# Patient Record
Sex: MALE | Race: WHITE | Employment: FULL TIME | URBAN - METROPOLITAN AREA
[De-identification: names, ages, dates, MRNs, and addresses within clinical notes are randomized per-mention and may not be internally consistent; named-entity substitution may affect disease eponyms.]

---

## 2024-11-13 ENCOUNTER — ANESTHESIA EVENT (OUTPATIENT)
Dept: SURGERY | Facility: HOSPITAL | Age: 29
End: 2024-11-13
Payer: COMMERCIAL

## 2024-11-14 ENCOUNTER — HOSPITAL ENCOUNTER (INPATIENT)
Facility: HOSPITAL | Age: 29
LOS: 4 days | Discharge: HOME | End: 2024-11-18
Attending: ORTHOPAEDIC SURGERY | Admitting: ORTHOPAEDIC SURGERY
Payer: COMMERCIAL

## 2024-11-14 ENCOUNTER — ANESTHESIA (OUTPATIENT)
Dept: SURGERY | Facility: HOSPITAL | Age: 29
End: 2024-11-14
Payer: COMMERCIAL

## 2024-11-14 DIAGNOSIS — L02.413 ABSCESS OF RIGHT SHOULDER: Primary | ICD-10-CM

## 2024-11-14 LAB
ANION GAP SERPL CALC-SCNC: 6 MMOL/L (ref 0–18)
BUN BLD-MCNC: 11 MG/DL (ref 9–23)
CALCIUM BLD-MCNC: 9.5 MG/DL (ref 8.7–10.4)
CHLORIDE SERPL-SCNC: 106 MMOL/L (ref 98–112)
CO2 SERPL-SCNC: 25 MMOL/L (ref 21–32)
CREAT BLD-MCNC: 0.92 MG/DL
EGFRCR SERPLBLD CKD-EPI 2021: 115 ML/MIN/1.73M2 (ref 60–?)
ERYTHROCYTE [SEDIMENTATION RATE] IN BLOOD: 17 MM/HR
GLUCOSE BLD-MCNC: 142 MG/DL (ref 70–99)
OSMOLALITY SERPL CALC.SUM OF ELEC: 286 MOSM/KG (ref 275–295)
POTASSIUM SERPL-SCNC: 4.2 MMOL/L (ref 3.5–5.1)
SODIUM SERPL-SCNC: 137 MMOL/L (ref 136–145)

## 2024-11-14 PROCEDURE — 87075 CULTR BACTERIA EXCEPT BLOOD: CPT | Performed by: ORTHOPAEDIC SURGERY

## 2024-11-14 PROCEDURE — 87205 SMEAR GRAM STAIN: CPT | Performed by: ORTHOPAEDIC SURGERY

## 2024-11-14 PROCEDURE — 0PB90ZZ EXCISION OF RIGHT CLAVICLE, OPEN APPROACH: ICD-10-PCS | Performed by: ORTHOPAEDIC SURGERY

## 2024-11-14 PROCEDURE — 87102 FUNGUS ISOLATION CULTURE: CPT | Performed by: ORTHOPAEDIC SURGERY

## 2024-11-14 PROCEDURE — 87206 SMEAR FLUORESCENT/ACID STAI: CPT | Performed by: ORTHOPAEDIC SURGERY

## 2024-11-14 PROCEDURE — 85652 RBC SED RATE AUTOMATED: CPT | Performed by: ORTHOPAEDIC SURGERY

## 2024-11-14 PROCEDURE — 87176 TISSUE HOMOGENIZATION CULTR: CPT | Performed by: ORTHOPAEDIC SURGERY

## 2024-11-14 PROCEDURE — 87070 CULTURE OTHR SPECIMN AEROBIC: CPT | Performed by: ORTHOPAEDIC SURGERY

## 2024-11-14 PROCEDURE — 0MPX0YZ REMOVAL OF OTHER DEVICE FROM UPPER BURSA AND LIGAMENT, OPEN APPROACH: ICD-10-PCS | Performed by: ORTHOPAEDIC SURGERY

## 2024-11-14 PROCEDURE — 3E0T3BZ INTRODUCTION OF ANESTHETIC AGENT INTO PERIPHERAL NERVES AND PLEXI, PERCUTANEOUS APPROACH: ICD-10-PCS | Performed by: ORTHOPAEDIC SURGERY

## 2024-11-14 PROCEDURE — 0MB10ZZ EXCISION OF RIGHT SHOULDER BURSA AND LIGAMENT, OPEN APPROACH: ICD-10-PCS | Performed by: ORTHOPAEDIC SURGERY

## 2024-11-14 PROCEDURE — 80048 BASIC METABOLIC PNL TOTAL CA: CPT | Performed by: ORTHOPAEDIC SURGERY

## 2024-11-14 RX ORDER — HYDROMORPHONE HYDROCHLORIDE 1 MG/ML
0.4 INJECTION, SOLUTION INTRAMUSCULAR; INTRAVENOUS; SUBCUTANEOUS EVERY 5 MIN PRN
Status: DISCONTINUED | OUTPATIENT
Start: 2024-11-14 | End: 2024-11-14 | Stop reason: HOSPADM

## 2024-11-14 RX ORDER — HYDROMORPHONE HYDROCHLORIDE 1 MG/ML
0.2 INJECTION, SOLUTION INTRAMUSCULAR; INTRAVENOUS; SUBCUTANEOUS EVERY 2 HOUR PRN
Status: DISCONTINUED | OUTPATIENT
Start: 2024-11-14 | End: 2024-11-18

## 2024-11-14 RX ORDER — ROCURONIUM BROMIDE 10 MG/ML
INJECTION, SOLUTION INTRAVENOUS AS NEEDED
Status: DISCONTINUED | OUTPATIENT
Start: 2024-11-14 | End: 2024-11-14 | Stop reason: SURG

## 2024-11-14 RX ORDER — LABETALOL HYDROCHLORIDE 5 MG/ML
5 INJECTION, SOLUTION INTRAVENOUS EVERY 5 MIN PRN
Status: DISCONTINUED | OUTPATIENT
Start: 2024-11-14 | End: 2024-11-14 | Stop reason: HOSPADM

## 2024-11-14 RX ORDER — CLONIDINE 100 UG/ML
INJECTION, SOLUTION EPIDURAL AS NEEDED
Status: DISCONTINUED | OUTPATIENT
Start: 2024-11-14 | End: 2024-11-14 | Stop reason: SURG

## 2024-11-14 RX ORDER — VANCOMYCIN HYDROCHLORIDE
15 EVERY 8 HOURS
Status: DISCONTINUED | OUTPATIENT
Start: 2024-11-14 | End: 2024-11-16

## 2024-11-14 RX ORDER — HYDROMORPHONE HYDROCHLORIDE 1 MG/ML
0.6 INJECTION, SOLUTION INTRAMUSCULAR; INTRAVENOUS; SUBCUTANEOUS EVERY 5 MIN PRN
Status: DISCONTINUED | OUTPATIENT
Start: 2024-11-14 | End: 2024-11-14 | Stop reason: HOSPADM

## 2024-11-14 RX ORDER — MIDAZOLAM HYDROCHLORIDE 1 MG/ML
1 INJECTION INTRAMUSCULAR; INTRAVENOUS EVERY 5 MIN PRN
Status: DISCONTINUED | OUTPATIENT
Start: 2024-11-14 | End: 2024-11-14 | Stop reason: HOSPADM

## 2024-11-14 RX ORDER — TRANEXAMIC ACID 10 MG/ML
INJECTION, SOLUTION INTRAVENOUS AS NEEDED
Status: DISCONTINUED | OUTPATIENT
Start: 2024-11-14 | End: 2024-11-14 | Stop reason: SURG

## 2024-11-14 RX ORDER — SODIUM PHOSPHATE, DIBASIC AND SODIUM PHOSPHATE, MONOBASIC 7; 19 G/230ML; G/230ML
1 ENEMA RECTAL ONCE AS NEEDED
Status: DISCONTINUED | OUTPATIENT
Start: 2024-11-14 | End: 2024-11-18

## 2024-11-14 RX ORDER — HYDROMORPHONE HYDROCHLORIDE 1 MG/ML
INJECTION, SOLUTION INTRAMUSCULAR; INTRAVENOUS; SUBCUTANEOUS
Status: COMPLETED
Start: 2024-11-14 | End: 2024-11-14

## 2024-11-14 RX ORDER — ONDANSETRON 2 MG/ML
4 INJECTION INTRAMUSCULAR; INTRAVENOUS EVERY 6 HOURS PRN
Status: DISCONTINUED | OUTPATIENT
Start: 2024-11-14 | End: 2024-11-14 | Stop reason: HOSPADM

## 2024-11-14 RX ORDER — MIDAZOLAM HYDROCHLORIDE 1 MG/ML
INJECTION INTRAMUSCULAR; INTRAVENOUS AS NEEDED
Status: DISCONTINUED | OUTPATIENT
Start: 2024-11-14 | End: 2024-11-14 | Stop reason: SURG

## 2024-11-14 RX ORDER — ALBUTEROL SULFATE 0.83 MG/ML
2.5 SOLUTION RESPIRATORY (INHALATION) AS NEEDED
Status: DISCONTINUED | OUTPATIENT
Start: 2024-11-14 | End: 2024-11-14 | Stop reason: HOSPADM

## 2024-11-14 RX ORDER — DEXAMETHASONE SODIUM PHOSPHATE 10 MG/ML
INJECTION, SOLUTION INTRAMUSCULAR; INTRAVENOUS AS NEEDED
Status: DISCONTINUED | OUTPATIENT
Start: 2024-11-14 | End: 2024-11-14 | Stop reason: SURG

## 2024-11-14 RX ORDER — PROCHLORPERAZINE EDISYLATE 5 MG/ML
5 INJECTION INTRAMUSCULAR; INTRAVENOUS EVERY 8 HOURS PRN
Status: DISCONTINUED | OUTPATIENT
Start: 2024-11-14 | End: 2024-11-18

## 2024-11-14 RX ORDER — PROCHLORPERAZINE EDISYLATE 5 MG/ML
INJECTION INTRAMUSCULAR; INTRAVENOUS
Status: COMPLETED
Start: 2024-11-14 | End: 2024-11-14

## 2024-11-14 RX ORDER — HYDROMORPHONE HYDROCHLORIDE 1 MG/ML
0.4 INJECTION, SOLUTION INTRAMUSCULAR; INTRAVENOUS; SUBCUTANEOUS EVERY 2 HOUR PRN
Status: DISCONTINUED | OUTPATIENT
Start: 2024-11-14 | End: 2024-11-18

## 2024-11-14 RX ORDER — HYDROCODONE BITARTRATE AND ACETAMINOPHEN 10; 325 MG/1; MG/1
1 TABLET ORAL ONCE AS NEEDED
Status: DISCONTINUED | OUTPATIENT
Start: 2024-11-14 | End: 2024-11-14 | Stop reason: HOSPADM

## 2024-11-14 RX ORDER — OXYCODONE HYDROCHLORIDE 5 MG/1
5 TABLET ORAL EVERY 4 HOURS PRN
Status: DISCONTINUED | OUTPATIENT
Start: 2024-11-14 | End: 2024-11-18

## 2024-11-14 RX ORDER — TRANEXAMIC ACID 10 MG/ML
1000 INJECTION, SOLUTION INTRAVENOUS ONCE
Status: DISCONTINUED | OUTPATIENT
Start: 2024-11-14 | End: 2024-11-14 | Stop reason: HOSPADM

## 2024-11-14 RX ORDER — ONDANSETRON 2 MG/ML
INJECTION INTRAMUSCULAR; INTRAVENOUS AS NEEDED
Status: DISCONTINUED | OUTPATIENT
Start: 2024-11-14 | End: 2024-11-14 | Stop reason: SURG

## 2024-11-14 RX ORDER — LIDOCAINE HYDROCHLORIDE 40 MG/ML
SOLUTION TOPICAL AS NEEDED
Status: DISCONTINUED | OUTPATIENT
Start: 2024-11-14 | End: 2024-11-14 | Stop reason: SURG

## 2024-11-14 RX ORDER — ACETAMINOPHEN 500 MG
1000 TABLET ORAL ONCE AS NEEDED
Status: DISCONTINUED | OUTPATIENT
Start: 2024-11-14 | End: 2024-11-14 | Stop reason: HOSPADM

## 2024-11-14 RX ORDER — KETAMINE HYDROCHLORIDE 50 MG/ML
INJECTION, SOLUTION INTRAMUSCULAR; INTRAVENOUS AS NEEDED
Status: DISCONTINUED | OUTPATIENT
Start: 2024-11-14 | End: 2024-11-14 | Stop reason: SURG

## 2024-11-14 RX ORDER — SENNOSIDES 8.6 MG
17.2 TABLET ORAL NIGHTLY
Status: DISCONTINUED | OUTPATIENT
Start: 2024-11-14 | End: 2024-11-18

## 2024-11-14 RX ORDER — DIPHENHYDRAMINE HYDROCHLORIDE 50 MG/ML
12.5 INJECTION INTRAMUSCULAR; INTRAVENOUS AS NEEDED
Status: DISCONTINUED | OUTPATIENT
Start: 2024-11-14 | End: 2024-11-14 | Stop reason: HOSPADM

## 2024-11-14 RX ORDER — BUPIVACAINE HYDROCHLORIDE 2.5 MG/ML
INJECTION, SOLUTION EPIDURAL; INFILTRATION; INTRACAUDAL AS NEEDED
Status: DISCONTINUED | OUTPATIENT
Start: 2024-11-14 | End: 2024-11-14 | Stop reason: SURG

## 2024-11-14 RX ORDER — BUPIVACAINE HYDROCHLORIDE AND EPINEPHRINE 2.5; 5 MG/ML; UG/ML
INJECTION, SOLUTION EPIDURAL; INFILTRATION; INTRACAUDAL; PERINEURAL AS NEEDED
Status: DISCONTINUED | OUTPATIENT
Start: 2024-11-14 | End: 2024-11-14 | Stop reason: HOSPADM

## 2024-11-14 RX ORDER — ONDANSETRON 2 MG/ML
4 INJECTION INTRAMUSCULAR; INTRAVENOUS EVERY 6 HOURS PRN
Status: DISCONTINUED | OUTPATIENT
Start: 2024-11-14 | End: 2024-11-18

## 2024-11-14 RX ORDER — ACETAMINOPHEN 500 MG
1000 TABLET ORAL ONCE
Status: DISCONTINUED | OUTPATIENT
Start: 2024-11-14 | End: 2024-11-14 | Stop reason: HOSPADM

## 2024-11-14 RX ORDER — NALOXONE HYDROCHLORIDE 0.4 MG/ML
80 INJECTION, SOLUTION INTRAMUSCULAR; INTRAVENOUS; SUBCUTANEOUS AS NEEDED
Status: DISCONTINUED | OUTPATIENT
Start: 2024-11-14 | End: 2024-11-14 | Stop reason: HOSPADM

## 2024-11-14 RX ORDER — PROCHLORPERAZINE EDISYLATE 5 MG/ML
5 INJECTION INTRAMUSCULAR; INTRAVENOUS EVERY 8 HOURS PRN
Status: DISCONTINUED | OUTPATIENT
Start: 2024-11-14 | End: 2024-11-14 | Stop reason: HOSPADM

## 2024-11-14 RX ORDER — SODIUM CHLORIDE, SODIUM LACTATE, POTASSIUM CHLORIDE, CALCIUM CHLORIDE 600; 310; 30; 20 MG/100ML; MG/100ML; MG/100ML; MG/100ML
INJECTION, SOLUTION INTRAVENOUS CONTINUOUS
Status: DISCONTINUED | OUTPATIENT
Start: 2024-11-14 | End: 2024-11-14

## 2024-11-14 RX ORDER — HYDROMORPHONE HYDROCHLORIDE 1 MG/ML
0.2 INJECTION, SOLUTION INTRAMUSCULAR; INTRAVENOUS; SUBCUTANEOUS EVERY 5 MIN PRN
Status: DISCONTINUED | OUTPATIENT
Start: 2024-11-14 | End: 2024-11-14 | Stop reason: HOSPADM

## 2024-11-14 RX ORDER — HYDROCODONE BITARTRATE AND ACETAMINOPHEN 10; 325 MG/1; MG/1
2 TABLET ORAL ONCE AS NEEDED
Status: DISCONTINUED | OUTPATIENT
Start: 2024-11-14 | End: 2024-11-14 | Stop reason: HOSPADM

## 2024-11-14 RX ORDER — POLYETHYLENE GLYCOL 3350 17 G/17G
17 POWDER, FOR SOLUTION ORAL DAILY PRN
Status: DISCONTINUED | OUTPATIENT
Start: 2024-11-14 | End: 2024-11-18

## 2024-11-14 RX ORDER — DOCUSATE SODIUM 100 MG/1
100 CAPSULE, LIQUID FILLED ORAL 2 TIMES DAILY
Status: DISCONTINUED | OUTPATIENT
Start: 2024-11-14 | End: 2024-11-18

## 2024-11-14 RX ORDER — SODIUM CHLORIDE, SODIUM LACTATE, POTASSIUM CHLORIDE, CALCIUM CHLORIDE 600; 310; 30; 20 MG/100ML; MG/100ML; MG/100ML; MG/100ML
INJECTION, SOLUTION INTRAVENOUS CONTINUOUS
Status: DISCONTINUED | OUTPATIENT
Start: 2024-11-14 | End: 2024-11-14 | Stop reason: HOSPADM

## 2024-11-14 RX ORDER — MEPERIDINE HYDROCHLORIDE 25 MG/ML
12.5 INJECTION INTRAMUSCULAR; INTRAVENOUS; SUBCUTANEOUS AS NEEDED
Status: DISCONTINUED | OUTPATIENT
Start: 2024-11-14 | End: 2024-11-14 | Stop reason: HOSPADM

## 2024-11-14 RX ORDER — BISACODYL 10 MG
10 SUPPOSITORY, RECTAL RECTAL
Status: DISCONTINUED | OUTPATIENT
Start: 2024-11-14 | End: 2024-11-18

## 2024-11-14 RX ORDER — DOXEPIN HYDROCHLORIDE 50 MG/1
1 CAPSULE ORAL DAILY
Status: DISCONTINUED | OUTPATIENT
Start: 2024-11-15 | End: 2024-11-18

## 2024-11-14 RX ADMIN — DEXAMETHASONE SODIUM PHOSPHATE 8 MG: 10 INJECTION, SOLUTION INTRAMUSCULAR; INTRAVENOUS at 17:41:00

## 2024-11-14 RX ADMIN — MIDAZOLAM HYDROCHLORIDE 2 MG: 1 INJECTION INTRAMUSCULAR; INTRAVENOUS at 17:28:00

## 2024-11-14 RX ADMIN — ROCURONIUM BROMIDE 50 MG: 10 INJECTION, SOLUTION INTRAVENOUS at 17:35:00

## 2024-11-14 RX ADMIN — MIDAZOLAM HYDROCHLORIDE 2 MG: 1 INJECTION INTRAMUSCULAR; INTRAVENOUS at 17:31:00

## 2024-11-14 RX ADMIN — KETAMINE HYDROCHLORIDE 15 MG: 50 INJECTION, SOLUTION INTRAMUSCULAR; INTRAVENOUS at 17:31:00

## 2024-11-14 RX ADMIN — ROCURONIUM BROMIDE 20 MG: 10 INJECTION, SOLUTION INTRAVENOUS at 19:38:00

## 2024-11-14 RX ADMIN — TRANEXAMIC ACID 1000 MG: 10 INJECTION, SOLUTION INTRAVENOUS at 17:38:00

## 2024-11-14 RX ADMIN — LIDOCAINE HYDROCHLORIDE 4 ML: 40 SOLUTION TOPICAL at 17:36:00

## 2024-11-14 RX ADMIN — SODIUM CHLORIDE, SODIUM LACTATE, POTASSIUM CHLORIDE, CALCIUM CHLORIDE: 600; 310; 30; 20 INJECTION, SOLUTION INTRAVENOUS at 17:26:00

## 2024-11-14 RX ADMIN — ONDANSETRON 4 MG: 2 INJECTION INTRAMUSCULAR; INTRAVENOUS at 19:42:00

## 2024-11-14 RX ADMIN — CLONIDINE 30 MCG: 100 INJECTION, SOLUTION EPIDURAL at 17:33:00

## 2024-11-14 RX ADMIN — BUPIVACAINE HYDROCHLORIDE 15 ML: 2.5 INJECTION, SOLUTION EPIDURAL; INFILTRATION; INTRACAUDAL at 17:33:00

## 2024-11-14 RX ADMIN — DEXAMETHASONE SODIUM PHOSPHATE 2 MG: 10 INJECTION, SOLUTION INTRAMUSCULAR; INTRAVENOUS at 17:33:00

## 2024-11-14 RX ADMIN — SODIUM CHLORIDE, SODIUM LACTATE, POTASSIUM CHLORIDE, CALCIUM CHLORIDE: 600; 310; 30; 20 INJECTION, SOLUTION INTRAVENOUS at 20:40:00

## 2024-11-14 NOTE — H&P
ORTHOPEDIC SURGERY H&P      Patient Name:Cara Nguyễn  Date of Appointment: 11/14/2024     Chief Complaint: Patient presents with:  Right Shoulder - Follow - Up        HPI:    The patient is a 29 year old male 3 months status post right  shoulder incision and drainage of an abscess at a surgical site  on 7/1/2024. Patient is doing well. Has no pain.  He was scheduled for right shoulder I&D in early September but canceled this as he has not had significant improvement in symptom burden.  He does notice since that time he has had at times recurrence of prominent area present to the mid- aspect of the clavicle medial to the shoulder incision.  Denies any fevers, chills, sweats.  States that overall he has been returning to increased level of activity and is able to tolerate this well.  He completed laboratory work and comes in today to review results.  Patient states he had mild URI symptoms at the time of blood work.     Physical Exam:      There were no vitals taken for this visit.     Constitutional: NAD, well-developed, well-nourished.  Neuro: SILT and motor grossly intact in the median, ulnar, radial nerve distribution. AIN/PIN are intact.   MSK:   On examination of the right upper extremity, surgical incisions are well healed. There is no surrounding erythema, purulent drainage, palpable fluctuance. Area of firmness present just medial to prior incision without fluctuance. Compartments are soft and compressible. AROM demonstrates no deficits. No tenderness appreciated. Extremity is NVI with intact distal pulses and brisk capillary refill in all digits.      Diagnostic Studies:      CULTURE, ANAEROBIC BACTERIA W/GRAM STAIN         Micro Number:      [id number]     Test Status:       Final     Specimen Source:   Right shoulder superficial     Specimen Quality:  Adequate     Gram Stain:        Rare White blood cells seen       Result:            No anaerobes isolated.      CULTURE,FUNGUS,SKIN,HAIR, NAIL W/DIRECT  FLUOR/KOH         Micro Number:      [id number]     Test Status:       Preliminary     Specimen Source:   Not given     Specimen Quality:  Adequate     Smear:             No fungal elements seen.       Result:            No fungal growth at 1 week      4 views of the right shoulder including AP, Grashey, scapular Y, and axillary lateral demonstrate no acute fracture or dislocation.  There is well-preserved glenohumeral joint space without evidence of degenerative changes or joint space narrowing. There is some mild irregularity present to the clavicle without evidence of recurrent instability or fracture.     MRI of the right shoulder with and without contrast dated 8/19/2024 demonstrates area of of postsurgical changes following CC ligament reconstruction with a surrounding fluid collection with associated edema and increased enhancement concerning for possible infectious process.  Remaining aspects of the shoulder appropriate in appearance and consistent with prior biceps tenodesis.     Laboratory work from 10/1/2024 demonstrates normal white blood cell count of 10.5, normal sed rate of 4, elevated CRP of 1.5.     Assessment:      29 year old year old male presents with the following diagnoses:     1. Mass of joint of right shoulder  - AEROBIC BACTERIAL CULTURE; Future  - ANAEROBIC CULTURE; Future  - ANAEROBIC CULTURE  - AEROBIC BACTERIAL CULTURE        Plan:      Plan:   - To OR for right shoulder I&D with removal of reconstruction allograft and deep hardware, possible glenohumeral arthrotomy     Tommie Rasheed MD  [facility]  Orthopedic Surgery, Sports Medicine

## 2024-11-14 NOTE — ANESTHESIA PROCEDURE NOTES
Airway  Date/Time: 11/14/2024 5:36 PM  Urgency: elective      General Information and Staff    Patient location during procedure: OR  Anesthesiologist: Fantasma Valentine MD  Performed: anesthesiologist   Performed by: Fantasma Valentine MD  Authorized by: Fantasma Valentine MD      Indications and Patient Condition  Indications for airway management: anesthesia  Sedation level: deep  Preoxygenated: yes  Patient position: sniffing  Mask difficulty assessment: 1 - vent by mask    Final Airway Details  Final airway type: endotracheal airway      Successful airway: ETT  Cuffed: yes   Successful intubation technique: direct laryngoscopy  Endotracheal tube insertion site: oral  Blade: Jada  Blade size: #3  ETT size (mm): 7.5    Cormack-Lehane Classification: grade I - full view of glottis  Placement verified by: capnometry   Measured from: lips  ETT to lips (cm): 21  Number of attempts at approach: 1

## 2024-11-15 LAB
CREAT BLD-MCNC: 0.96 MG/DL
EGFRCR SERPLBLD CKD-EPI 2021: 110 ML/MIN/1.73M2 (ref 60–?)

## 2024-11-15 PROCEDURE — 82565 ASSAY OF CREATININE: CPT | Performed by: ORTHOPAEDIC SURGERY

## 2024-11-15 RX ORDER — HYDROCODONE BITARTRATE AND ACETAMINOPHEN 5; 325 MG/1; MG/1
1 TABLET ORAL EVERY 6 HOURS PRN
Qty: 16 TABLET | Refills: 0 | Status: SHIPPED | OUTPATIENT
Start: 2024-11-15

## 2024-11-15 RX ORDER — NAPROXEN 500 MG/1
500 TABLET ORAL 2 TIMES DAILY WITH MEALS
Qty: 42 TABLET | Refills: 1 | Status: SHIPPED | OUTPATIENT
Start: 2024-11-15

## 2024-11-15 RX ORDER — ONDANSETRON 4 MG/1
4 TABLET, FILM COATED ORAL EVERY 8 HOURS PRN
Qty: 16 TABLET | Refills: 0 | Status: SHIPPED | OUTPATIENT
Start: 2024-11-15

## 2024-11-15 NOTE — PROGRESS NOTES
Anesthesia Pain Service    POD# 1 s/p TSA    Block type: Upper extremity: Interscalene    Laterality: right    Injection technique: Single shot    Post op review: No evidence of immediate block related complications    Plan discussed with/by: Anesthesia - Jerod Rizo MD

## 2024-11-15 NOTE — PROGRESS NOTES
Pt attempting to void at this time.  C/o numbness to his right hand, right hand warm, +2 pulse, good cap refill.

## 2024-11-15 NOTE — ANESTHESIA PROCEDURE NOTES
Regional Block    Performed by: Fantasma Valentine MD  Authorized by: Fantasma Valentine MD      General Information and Staff    Start Time:   Anesthesiologist:  Fantasma Valentine MD  Performed by:  Anesthesiologist  Patient Location:  OR    Block Placement: Pre Induction  Site Identification: real time ultrasound guided, nerve stimulator and image stored and retrievable    Block site/laterality marked before start: site marked  Reason for Block: at surgeon's request and post-op pain management    Preanesthetic Checklist: 2 patient identifers, IV checked, site marked, risks and benefits discussed, monitors and equipment checked, pre-op evaluation, timeout performed, anesthesia consent, sterile technique used, no prohibitive neurological deficits and no local skin infection at insertion site      Procedure Details    Patient Position:  Supine  Prep: ChloraPrep    Monitoring:  Heart rate, cardiac monitor, continuous pulse ox and blood pressure cuff  Block Type:  Interscalene  Laterality:  Right  Injection Technique:  Single-shot    Needle    Needle Type:  Echogenic  Needle Gauge:  21 G  Needle Length:  100 mm  Needle Localization:  Nerve stimulator and ultrasound guidance  Reason for Ultrasound Use: appropriate spread of the medication was noted in real time and no ultrasound evidence of intravascular and/or intraneural injection    Nerve Stimulator: 0.8 amps    Muscle Twitch Response Comment: no response    Assessment    Injection Assessment:  Good spread noted, incremental injection, local visualized surrounding nerve on ultrasound, low pressure, negative aspiration for heme, no pain on injection and negative resistance  Paresthesia Pain:  None  Heart Rate Change: No    - Patient tolerated block procedure well without evidence of immediate block related complications.     Medications      Additional Comments    Bupivacaine 0.25% 15cc, decadron 2 mg PF, clonidine 30 mcg

## 2024-11-15 NOTE — CONSULTS
Infectious Disease Initial Consultation      Date of admission: 11/14/2024  3:44 PM     Date of service: 11/15/24 8:01 AM    Consult requested by: Tommie Rasheed MD    Reason for consult: Right shoulder infection    Chief complaint: Right shoulder pain    History of present illness: Cara Nguyễn is a 29 year old male with history of right shoulder repair in summer 2023, recently had an abscess at the surgical site back on 7/20/2024 for which he underwent an I&D.  Despite that, continued to have worsening pain.  Prior to the July, the patient had a cyst that was also removed.  He was also trialed on a prolonged course of oral antibiotics throughout the year and despite that continued to have recurrent cysts.  Eventually, had 1 aspirated that was cloudy and due to concerns of infection, was brought in for the debridement on 11/14.    Upon presentation here, the patient was afebrile, hemodynamically stable.  BMP did not show any acute abnormalities.  He was taken to the OR on 9/14, surgical cultures are pending.  The patient is currently on IV ceftriaxone and IV vancomycin.  Cultures from his abscess I&D back in July did not grow any organisms.    Risk factors/Exposures:  Living situation: At home with family  Sick contacts: None  Animals: No pets or other animal exposure  Travel: No recent local/international travel  /longterm/halfway: None  Outdoor activities: Nothing unusual  Sexual behavior: Heterosexual, no high risk sexual behavior  Active TB exposure: None  Employment: Currently unemployed  IV drug use: None    Review of systems:  All other components of the review of systems are negative, except those described in the history of present illness.     Past Medical History:    Calculus of kidney     Past Surgical History:   Procedure Laterality Date    Arthroscopy of joint unlisted Right     x 3    Tonsillectomy       Social History     Socioeconomic History    Marital status: Single   Tobacco Use     Smoking status: Never    Smokeless tobacco: Never   Vaping Use    Vaping status: Never Used   Substance and Sexual Activity    Alcohol use: Yes     Alcohol/week: 3.0 standard drinks of alcohol     Types: 3 Cans of beer per week    Drug use: Never     Social Drivers of Health     Food Insecurity: No Food Insecurity (11/14/2024)    Food Insecurity     Food Insecurity: Never true   Transportation Needs: No Transportation Needs (11/14/2024)    Transportation Needs     Lack of Transportation: No   Housing Stability: Low Risk  (11/14/2024)    Housing Stability     Housing Instability: No     History reviewed. No pertinent family history.  Reviewed, see above    Medications:    sennosides    docusate sodium    polyethylene glycol (PEG 3350)    magnesium hydroxide    bisacodyl    fleet enema    ondansetron    prochlorperazine    multivitamin    oxyCODONE **OR** oxyCODONE    HYDROmorphone **OR** HYDROmorphone    cefTRIAXone    vancomycin     Allergies:  Allergies[1]    Physical Exam:  Vitals:    11/15/24 0553   BP: 119/61   Pulse: 62   Resp: 16   Temp: 98.2 °F (36.8 °C)     Vitals signs and nursing note reviewed.   Constitutional:       Appearance: Normal appearance.   HENT:      Head: Normocephalic and atraumatic.      Mouth: Mucous membranes are moist.   Neck:      Musculoskeletal: Neck supple.   Cardiovascular:      Rate and Rhythm: Normal rate.      Heart sounds: Normal heart sounds. No murmur. No friction rub. No gallop.    Pulmonary:      Effort: Pulmonary effort is normal. No respiratory distress.      Breath sounds: Normal breath sounds. No stridor. No wheezing, rhonchi or rales.   Chest:      Chest wall: No tenderness.   Abdominal:      General: Abdomen is flat. There is no distension.      Palpations: Abdomen is soft. There is no mass.      Tenderness: There is no tenderness. There is no guarding or rebound.      Hernia: No hernia is present.   Musculoskeletal:      Right lower leg: No edema.      Left lower leg:  No edema.   Skin:     General: Skin is warm and dry.   Neurological:      General: No focal deficit present.      Mental Status: Alert and oriented to person, place, and time.     Laboratory data:  I have independently reviewed all lab results; including old microbiological results.  Lab Results   Component Value Date    CREATSERUM 0.96 11/15/2024    BUN 11 11/14/2024     11/14/2024    K 4.2 11/14/2024     11/14/2024    CO2 25.0 11/14/2024     11/14/2024    CA 9.5 11/14/2024    ESRML 17 11/14/2024        No results for input(s): \"RBC\", \"HGB\", \"HCT\", \"MCV\", \"MCH\", \"MCHC\", \"RDW\", \"NEPRELIM\", \"WBC\", \"PLT\", \"NEUT\", \"LYMPH\", \"MON\", \"EOS\", \"NRBC\" in the last 168 hours.    Microbiology data:  No results found for this visit on 11/14/24.    Impression:  Cara Nguyễn is a 29 year old male with     Right shoulder infection  This is in the setting of a shoulder repair back in the summer 2023 with a graft and sutures  Now the graft was removed except for a very small part that was left very close to his neurovascular bundle  Status post I&D debridement and explant of prosthesis on 11/14/2024  Cultures pending  Currently on ceftriaxone and vancomycin    Recommendations:     Continue to follow-up on surgical cultures  Will need to hold the cultures for 3 weeks  Continue ceftriaxone and IV vancomycin  I explained to the patient that given small part of the graft was retained, there is always a possibility of the infection could recur.  I would recommend a prolonged course of IV antibiotics followed by oral suppression for several months prior to cessation.  However, I explained to him that up to 20% of patients could have recurrent infection in the setting of retained hardware.  He understood.  PICC line ordered  Continue to monitor daily labs for antibiotic toxicity  Further recommendations will depend on the above work-up and clinical progress     The plan of care was discussed with the primary  hospital team, Tommie Rasheed MD     Recommendations were also discussed with the patient; all questions were answered.     Thank you for this consultation. Please don't hesitate to call the ID team for questions or any acute changes in patient's clinical condition.    Please note that this report has been produced using speech recognition software and may contain errors related to that system including, but not limited to, errors in grammar, punctuation, and spelling, as well as words and phrases that possibly may have been recognized inappropriately.  If there are any questions or concerns, contact the dictating provider for clarification.    The  [employer] Cures Act makes medical notes like these available to patients in the interest of transparency. Please be advised this is a medical document. Medical documents are intended to carry relevant information, facts as evident, and the clinical opinion of the practitioner. The medical note is intended as peer to peer communication and may appear blunt or direct. It is written in medical language and may contain abbreviations or verbiage that are unfamiliar.     Nam Lawrence MD  [facility]. Tel: [phone]. Fax: [phone].     Cara Nguyễn : 8/10/1995 MRN: [id number] CSN: [id number]          [1] No Known Allergies

## 2024-11-15 NOTE — PROGRESS NOTES
[facility] Dosing Service      Initial Pharmacokinetic Consult for Vancomycin Dosing     Cara Nguyễn is a 29 year old male who is being initiated on vancomycin therapy for osteomyelitis/I&D of right shoulder abscess/removal of hardware. Pharmacy has been asked to dose vancomycin by Dr. Rasheed.  The initial treatment and monitoring approach will be steady state AUC strategy.        Weight and Temperature:    Wt Readings from Last 1 Encounters:   24 87.1 kg (192 lb)        Temp Readings from Last 1 Encounters:   24 97.7 °F (36.5 °C) (Oral)      Labs:   Recent Labs   Lab 24  2238   CREATSERUM 0.92      Estimated Creatinine Clearance: 118.5 mL/min (based on SCr of 0.92 mg/dL).     No results for input(s): \"WBC\" in the last 168 hours.       The Pharmacokinetic Target is:       to 600 mg-h/L and trough <=15 mg/L    Renal Dosing Considerations:      None     Assessment/Plan:       Maintenance dose: Pharmacy will dose vancomycin at 1250 mg IV every 8 hours.    Monitorin) Plan for vancomycin peak and trough to be obtained at steady state.    2) Pharmacy will order SCr as clinically indicated to assess renal function.    3) Pharmacy will monitor for toxicity and efficacy, adjust vancomycin dose and/or frequency, and order vancomycin levels as appropriate per the Pharmacy and Therapeutics Committee approved protocol until discontinuation of the medication.       We appreciate the opportunity to assist in the care of this patient.     Quin Tejeda, Atif  2024  11:11 PM  [facility] Extension: [phone]

## 2024-11-15 NOTE — PAYOR COMM NOTE
ADMISSION REVIEW     Payor: [employer] [place] POS/MCNP  Subscriber #:  [id number]  Authorization Number: [id number]    Admit date: 11/14/24  Admit time:  9:30 PM       REVIEW DOCUMENTATION:  ED Provider Notes    No notes of this type exist for this encounter.         MEDICATIONS ADMINISTERED IN LAST 1 DAY:  bupivacaine PF (Marcaine) 0.25% injection       Date Action Dose Route User    11/14/2024 1733 Given 15 mL Injection Fantasma Valentine MD          bupivacaine 0.25%-EPINEPHrine 1:200,000 PF (Marcaine/Epinephrine PF) injection       Date Action Dose Route User    11/14/2024 2003 Given 30 mL Infiltration (Right Shoulder) Tommie Rasheed MD          ceFAZolin (Ancef) 2g in 10mL IV syringe premix       Date Action Dose Route User    11/14/2024 1809 Given 2 g Intravenous Fantasma Valentine MD          cefTRIAXone (Rocephin) 2 g in sodium chloride 0.9% 100 mL IVPB-ADDV       Date Action Dose Route User    11/14/2024 2301 New Bag 2 g Intravenous Shelley Wilson RN          cloNIDine (analgesia) (Duraclon) 100 MCG/ML epidural injection       Date Action Dose Route User    11/14/2024 1733 Given 30 mcg Other Fantasma Valentine MD          dexAMETHasone PF (Decadron) 10 MG/ML injection       Date Action Dose Route User    11/14/2024 1733 Given 2 mg Injection Fantasma Valentine MD          dexAMETHasone PF (Decadron) 10 MG/ML injection       Date Action Dose Route User    11/14/2024 1741 Given 8 mg Intravenous Fantasma Valentine MD          docusate sodium (Colace) cap 100 mg       Date Action Dose Route User    11/15/2024 0900 Given 100 mg Oral Osmin Martinez RN    11/14/2024 2301 Given 100 mg Oral Shelley Wilson RN          fentaNYL (Sublimaze) 50 mcg/mL injection       Date Action Dose Route User    11/14/2024 1812 Given 100 mcg Intravenous Fantasma Valentine MD    11/14/2024 1751 Given 100 mcg Intravenous Fantasma Valentine MD          HYDROmorphone (Dilaudid) 1 MG/ML injection 0.4 mg       Date Action Dose Route User    11/14/2024 2051 Given 0.4 mg Intravenous Savaglio,  AMOL Felipe          HYDROmorphone (Dilaudid) 1 MG/ML injection 0.6 mg       Date Action Dose Route User    11/14/2024 2100 Given 0.6 mg Intravenous Matteo Barrientos RN          ketamine (Ketalar) 50 MG/ML injection       Date Action Dose Route User    11/14/2024 1731 Given 15 mg Intravenous Fantasma Valentine MD          lactated ringers infusion       Date Action Dose Route User    11/14/2024 1726 Restarted (none) Intravenous Fantasma Valentine MD    11/14/2024 1619 New Bag (none) Intravenous Marisabel Blackwell RN          Lidocaine HCl (LARYNG-O-SET) 4 % solution       Date Action Dose Route User    11/14/2024 1736 Given 4 mL Endotracheal Fantasma Valentine MD          midazolam (Versed) 2 MG/2ML injection       Date Action Dose Route User    11/14/2024 1731 Given 2 mg Intravenous Fantasma Valentine MD    11/14/2024 1728 Given 2 mg Intravenous Fantasma Valentine MD          ondansetron (Zofran) 4 MG/2ML injection       Date Action Dose Route User    11/14/2024 1942 Given 4 mg Intravenous Sukhi Penny MD          oxyCODONE immediate release tab 5 mg       Date Action Dose Route User    11/15/2024 1314 Given 5 mg Oral Osmin Martinez RN          prochlorperazine (Compazine) 10 MG/2ML injection 5 mg       Date Action Dose Route User    11/14/2024 2051 Given 5 mg Intravenous Matteo Barrientos RN          propofol (Diprivan) 10 MG/ML injection       Date Action Dose Route User    11/14/2024 1735 Given 200 mg Intravenous Fantasma Valentine MD          rocuronium (Zemuron) 50 mg/5mL injection       Date Action Dose Route User    11/14/2024 1938 Given 20 mg Intravenous Sukhi Penny MD    11/14/2024 1735 Given 50 mg Intravenous Fantasma Valentine MD          sennosides (Senokot) tab 17.2 mg       Date Action Dose Route User    11/14/2024 2301 Given 17.2 mg Oral Shelley Wilson RN          sugammadex (Bridion) 200 MG/2ML injection       Date Action Dose Route User    11/14/2024 2013 Given 200 mg Intravenous Sukhi Penny MD          multivitamin (Tab-A-Shakira/Beta  Carotene) tab 1 tablet       Date Action Dose Route User    11/15/2024 0901 Given 1 tablet Oral Osmin Martinez RN          tranexamic acid in sodium chloride 0.7% (Cyklokapron) 1000 mg/100mL infusion premix       Date Action Dose Route User    11/14/2024 1738 Given 1,000 mg Intravenous Fantasma Valentine MD          vancomycin (Vancocin) 1.25 g in sodium chloride 0.9% 250mL IVPB premix       Date Action Dose Route User    11/15/2024 0840 New Bag 1,250 mg Intravenous Osmin Martinez RN    11/15/2024 0000 New Bag 1,250 mg Intravenous Shelley Wilson RN            Vitals (last day)       Date/Time Temp Pulse Resp BP SpO2 Weight O2 Device O2 Flow Rate (L/min) [facility]    11/15/24 1350 98.6 °F (37 °C) 74 20 118/63 98 % -- None (Room air) --     11/15/24 0806 97.6 °F (36.4 °C) 60 18 126/59 98 % -- None (Room air) --     11/15/24 0553 98.2 °F (36.8 °C) 62 16 119/61 95 % -- None (Room air) 0 L/min     11/15/24 0000 98.1 °F (36.7 °C) 56 16 113/59 95 % -- Nasal cannula 3 L/min     11/14/24 2156 -- -- -- -- -- 192 lb (87.1 kg) -- --     11/14/24 2156 97.7 °F (36.5 °C) 66 16 108/71 95 % -- Nasal cannula 3 L/min CM    11/14/24 2115 -- 66 16 112/62 95 % -- -- -- LSA    11/14/24 2100 98.2 °F (36.8 °C) 87 17 -- 96 % -- Nasal cannula 3 L/min A    11/14/24 2055 -- 97 16 114/89 97 % -- -- -- LSA    11/14/24 2050 -- 79 18 -- -- -- -- -- LSA    11/14/24 2045 -- 85 12 122/79 96 % -- -- -- LSA    11/14/24 2040 98.1 °F (36.7 °C) 80 16 110/48 94 % -- -- -- SA    11/14/24 2035 -- 80 19 124/82 -- -- -- -- LSA    11/14/24 2030 97.1 °F (36.2 °C) 85 14 110/48 95 % -- Nasal cannula 3 L/min LSA    11/14/24 1609 98.8 °F (37.1 °C) 65 65 134/92 100 % 192 lb (87.1 kg) None (Room air) -- NH         11/14/2024 H&P    ORTHOPEDIC SURGERY H&P      Patient Name:Cara Nguyễn  Date of Appointment: 11/14/2024     Chief Complaint: Patient presents with:  Right Shoulder - Follow - Up        HPI:    The patient is a 29 year old male 3 months status post  right  shoulder incision and drainage of an abscess at a surgical site  on 7/1/2024. Patient is doing well. Has no pain.  He was scheduled for right shoulder I&D in early September but canceled this as he has not had significant improvement in symptom burden.  He does notice since that time he has had at times recurrence of prominent area present to the mid- aspect of the clavicle medial to the shoulder incision.  Denies any fevers, chills, sweats.  States that overall he has been returning to increased level of activity and is able to tolerate this well.  He completed laboratory work and comes in today to review results.  Patient states he had mild URI symptoms at the time of blood work.     Physical Exam:      There were no vitals taken for this visit.     Constitutional: NAD, well-developed, well-nourished.  Neuro: SILT and motor grossly intact in the median, ulnar, radial nerve distribution. AIN/PIN are intact.   MSK:   On examination of the right upper extremity, surgical incisions are well healed. There is no surrounding erythema, purulent drainage, palpable fluctuance. Area of firmness present just medial to prior incision without fluctuance. Compartments are soft and compressible. AROM demonstrates no deficits. No tenderness appreciated. Extremity is NVI with intact distal pulses and brisk capillary refill in all digits.      Diagnostic Studies:      CULTURE, ANAEROBIC BACTERIA W/GRAM STAIN         Micro Number:      [id number]     Test Status:       Final     Specimen Source:   Right shoulder superficial     Specimen Quality:  Adequate     Gram Stain:        Rare White blood cells seen       Result:            No anaerobes isolated.      CULTURE,FUNGUS,SKIN,HAIR, NAIL W/DIRECT FLUOR/KOH         Micro Number:      [id number]     Test Status:       Preliminary     Specimen Source:   Not given     Specimen Quality:  Adequate     Smear:             No fungal elements seen.       Result:            No fungal growth  at 1 week      4 views of the right shoulder including AP, Grashey, scapular Y, and axillary lateral demonstrate no acute fracture or dislocation.  There is well-preserved glenohumeral joint space without evidence of degenerative changes or joint space narrowing. There is some mild irregularity present to the clavicle without evidence of recurrent instability or fracture.     MRI of the right shoulder with and without contrast dated 8/19/2024 demonstrates area of of postsurgical changes following CC ligament reconstruction with a surrounding fluid collection with associated edema and increased enhancement concerning for possible infectious process.  Remaining aspects of the shoulder appropriate in appearance and consistent with prior biceps tenodesis.     Laboratory work from 10/1/2024 demonstrates normal white blood cell count of 10.5, normal sed rate of 4, elevated CRP of 1.5.     Assessment:      29 year old year old male presents with the following diagnoses:     1. Mass of joint of right shoulder  - AEROBIC BACTERIAL CULTURE; Future  - ANAEROBIC CULTURE; Future  - ANAEROBIC CULTURE  - AEROBIC BACTERIAL CULTURE        Plan:      Plan:   - To OR for right shoulder I&D with removal of reconstruction allograft and deep hardware, possible glenohumeral arthrotomy     Tommie Rasheed MD  [facility]  Orthopedic Surgery, Sports Medicine    Tommie Rasheed MD   Physician  Orthopedics     Operative Report     Signed     Date of Service: 11/14/2024  5:57 PM  Case Time: Procedures: Surgeons:   11/14/2024  5:57 PM RIGHT SHOULDER IRRIGATION AND DEBRIDEMENT, REMOVAL OF IMPLANT DEEP, Revision of surgical scar    Tommie Rasheed MD               Signed         Pre-Operative Diagnosis: RIGHT SHOULDER ABSCESS; INFECTION OF DEEP IMPLANT     Post-Operative Diagnosis: RIGHT SHOULDER ABSCESS; INFECTION OF DEEP IMPLANT      Procedure Performed:   RIGHT SHOULDER SURGICAL SCAR REVISION, IRRIGATION AND DEBRIDEMENT, REMOVAL OF  IMPLANT DEEP     Surgeons and Role:     * Tommie Rasheed MD - Primary     Assistant(s):  PA: Ryder Kwok PA-C     Skilled assistance was needed for patient positioning, prepping and draping, instrument holding and passing, retracting, and suturing.     Surgical Findings: Abscess involving superior aspect of prior ligament reconstruction with bone involvement of adjacent clavicle without extension deep to the joint or to the AC joint     Specimen: Deep culture x3     Estimated Blood Loss: Blood Output: 100 mL (2024  8:02 PM)                  [facility] Orthopedics  Progress Note           Cara Nguyễn Patient Status:  Outpatient in a Bed    8/10/1995 MRN [id number]   [facility] 3SW-A Attending Tommie Rasheed MD   Hosp Day # 0 PCP JOSE RAUL DIAZ, LIA       Subjective:  POD #1 from a right shoulder irrigation and debridement and removal of deep implant, revision of surgical scar on 2024 with Dr. Rasheed.     No overnight events.  Patient's nerve block is still in place and he is starting to feel some sensation in his fingertips but does not have any sensation proximally.  He states that his pain is very well-controlled.  Patient is in a sling and is tolerating this well.  Denies nausea or vomiting.  Denies fever or chills.  Denies chest pain shortness of breath.           Objective:  Vital signs in last 24 hours:  Patient Vitals for the past 24 hrs:    BP Temp Temp src Pulse Resp SpO2 Weight   11/15/24 0806 126/59 97.6 °F (36.4 °C) Oral 60 18 98 % --   11/15/24 0553 119/61 98.2 °F (36.8 °C) Oral 62 16 95 % --   11/15/24 0000 113/59 98.1 °F (36.7 °C) Oral 56 16 95 % --   24 2156 108/71 97.7 °F (36.5 °C) Oral 66 16 95 % 192 lb (87.1 kg)   24 112/62 -- -- 66 16 95 % --   24 2100 -- 98.2 °F (36.8 °C) Temporal 87 17 96 % --   24 114/89 -- -- 97 16 97 % --   24 -- -- -- 79 18 -- --   24 122/79 -- -- 85 12 96 % --    11/14/24 2040 110/48 98.1 °F (36.7 °C) -- 80 16 94 % --   11/14/24 2035 124/82 -- -- 80 19 -- --   11/14/24 2030 110/48 97.1 °F (36.2 °C) Temporal 85 14 95 % --   11/14/24 1609 (!) 134/92 98.8 °F (37.1 °C) Oral 65 (!) 65 100 % 192 lb (87.1 kg)            General: A&Ox3, NAD  Neuro: Nerve block still in place.  Motor grossly intact in the median, ulnar, radial nerve distribution of the hand.  Sensation intact in the ulnar distribution of the hand but sensation to light touch is not intact proximally due to nerve block.  MSK:   Right upper extremity currently in sling.  Surgical dressings in place and RN good condition.  No strikethrough drainage appreciated.  There is no streaking erythema of the arm distally.  Has good range of motion of the wrist, digits, elbow.  Extremity is neurovascular intact.      Results  Data Review  CBC: No results found for: \"WBC\", \"RBC\", \"HGB\", \"HCT\", \"PLT\"        Assessment  Microbiology data:  No results found for this visit on 11/14/24.      Assessment/Plan:     This is a 29-year-old male who is status post CC ligament reconstruction as well as open biceps tenodesis to the right shoulder on 9/5/2023.  He subsequently developed a postoperative abscess that was incised and drained on 7/1/2024 but had recurring infection despite oral antibiotic regimen.  Patient is currently postop day 1 from a repeat right shoulder irrigation and debridement, removal of hardware and deep implant, revision of surgical scar.  Patient was admitted for IV antibiotics as well as consultation with infectious disease.  Patient overall is doing well and is stable.  Pain well-controlled with nerve block in place.     1) continue to maintain surgical dressing for the next 4 to 5 days and keep the dressings clean and dry.  4 to 5 days postop he removed the surgical dressings and continue to apply waterproof Band-Aids over the surgical incisions.  2) continue to use sling as tolerated.  Once nerve block is worn  off and he is able to tolerate movement of the shoulder he is cleared to discontinue the sling as tolerated.  3) patient may continue to progress his shoulder range of motion as tolerated.  He will be nonweightbearing about the right shoulder.  4) currently on ceftriaxone and vancomycin for broad-spectrum coverage of known infection.  Infectious disease on consult for antibiotic management.  5) will continue to follow for cultures.  Please hold cultures for at least 21 days to monitor for P acnes.  6) DVT ppx: Mechanical only.  7) continue to follow for culture result.  Patient is cleared from orthopedic standpoint to be discharged once infectious disease establishes antibiotic plan.  Patient will follow-up as outpatient in 2 weeks.  Patient's postoperative pain medication was sent to the patient's pharmacy.  Infectious disease will manage outpatient antibiotics.           Ryder Kwok PA-C  [facility]  Orthopedic Surgery  11/15/2024  7:23 AM

## 2024-11-15 NOTE — ANESTHESIA POSTPROCEDURE EVALUATION
[facility]    Cara Nguyễn Patient Status:  Hospital Outpatient Surgery   Age/Gender 29 year old male MRN [id number]   Location [facility] POST ANESTHESIA CARE UNIT Attending Tommie Rasheed MD   Hosp Day # 0 PCP JOSE RAUL DIAZ, LIA       Anesthesia Post-op Note    RIGHT SHOULDER IRRIGATION AND DEBRIDEMENT, REMOVAL OF IMPLANT DEEP, Revision of surgical scar    Procedure Summary       Date: 11/14/24 Room / Location:  MAIN OR 12 /  MAIN OR    Anesthesia Start: 1726 Anesthesia Stop:     Procedure: RIGHT SHOULDER IRRIGATION AND DEBRIDEMENT, REMOVAL OF IMPLANT DEEP, Revision of surgical scar (Right: Shoulder) Diagnosis: (ABSCESS; MASS OF JOINT)    Surgeons: Tommie Rasheed MD Anesthesiologist: Sukhi Penny MD    Anesthesia Type: general, regional ASA Status: 2            Anesthesia Type: general, regional    Vitals Value Taken Time   /48 11/14/24 2040   Temp 98.1 °F (36.7 °C) 11/14/24 2040   Pulse 78 11/14/24 2040   Resp 15 11/14/24 2040   SpO2 96 % 11/14/24 2040   Vitals shown include unfiled device data.    Patient Location: PACU    Anesthesia Type: general    Airway Patency: patent    Postop Pain Control: adequate    Mental Status: mildly sedated but able to meaningfully participate in the post-anesthesia evaluation    Nausea/Vomiting: none    Cardiopulmonary/Hydration status: stable euvolemic    Complications: no apparent anesthesia related complications    Postop vital signs: stable    Dental Exam: Unchanged from Preop    Patient to be discharged from PACU when criteria met.

## 2024-11-15 NOTE — PROGRESS NOTES
[facility] Orthopedics  Progress Note    Cara Nguyễn Patient Status:  Outpatient in a Bed    8/10/1995 MRN [id number]   [facility] 3SW-A Attending Tommie Rasheed MD   Hosp Day # 0 PCP JOSE RAUL DIAZ, LIA       Subjective:  POD #1 from a right shoulder irrigation and debridement and removal of deep implant, revision of surgical scar on 2024 with Dr. Rasheed.    No overnight events.  Patient's nerve block is still in place and he is starting to feel some sensation in his fingertips but does not have any sensation proximally.  He states that his pain is very well-controlled.  Patient is in a sling and is tolerating this well.  Denies nausea or vomiting.  Denies fever or chills.  Denies chest pain shortness of breath.          Objective:  Vital signs in last 24 hours:  Patient Vitals for the past 24 hrs:   BP Temp Temp src Pulse Resp SpO2 Weight   11/15/24 0806 126/59 97.6 °F (36.4 °C) Oral 60 18 98 % --   11/15/24 0553 119/61 98.2 °F (36.8 °C) Oral 62 16 95 % --   11/15/24 0000 113/59 98.1 °F (36.7 °C) Oral 56 16 95 % --   24 2156 108/71 97.7 °F (36.5 °C) Oral 66 16 95 % 192 lb (87.1 kg)   245 112/62 -- -- 66 16 95 % --   24 -- 98.2 °F (36.8 °C) Temporal 87 17 96 % --   24 114/89 -- -- 97 16 97 % --   24 -- -- -- 79 18 -- --   24 122/79 -- -- 85 12 96 % --   24 110/48 98.1 °F (36.7 °C) -- 80 16 94 % --   24 124/82 -- -- 80 19 -- --   24 110/48 97.1 °F (36.2 °C) Temporal 85 14 95 % --   24 1609 (!) 134/92 98.8 °F (37.1 °C) Oral 65 (!) 65 100 % 192 lb (87.1 kg)         General: A&Ox3, NAD  Neuro: Nerve block still in place.  Motor grossly intact in the median, ulnar, radial nerve distribution of the hand.  Sensation intact in the ulnar distribution of the hand but sensation to light touch is not intact proximally due to nerve block.  MSK:   Right upper extremity currently in sling.   Surgical dressings in place and RN good condition.  No strikethrough drainage appreciated.  There is no streaking erythema of the arm distally.  Has good range of motion of the wrist, digits, elbow.  Extremity is neurovascular intact.          Data Review  CBC: No results found for: \"WBC\", \"RBC\", \"HGB\", \"HCT\", \"PLT\"          Microbiology data:  No results found for this visit on 11/14/24.     Assessment/Plan:    This is a 29-year-old male who is status post CC ligament reconstruction as well as open biceps tenodesis to the right shoulder on 9/5/2023.  He subsequently developed a postoperative abscess that was incised and drained on 7/1/2024 but had recurring infection despite oral antibiotic regimen.  Patient is currently postop day 1 from a repeat right shoulder irrigation and debridement, removal of hardware and deep implant, revision of surgical scar.  Patient was admitted for IV antibiotics as well as consultation with infectious disease.  Patient overall is doing well and is stable.  Pain well-controlled with nerve block in place.    1) continue to maintain surgical dressing for the next 4 to 5 days and keep the dressings clean and dry.  4 to 5 days postop he removed the surgical dressings and continue to apply waterproof Band-Aids over the surgical incisions.  2) continue to use sling as tolerated.  Once nerve block is worn off and he is able to tolerate movement of the shoulder he is cleared to discontinue the sling as tolerated.  3) patient may continue to progress his shoulder range of motion as tolerated.  He will be nonweightbearing about the right shoulder.  4) currently on ceftriaxone and vancomycin for broad-spectrum coverage of known infection.  Infectious disease on consult for antibiotic management.  5) will continue to follow for cultures.  Please hold cultures for at least 21 days to monitor for P acnes.  6) DVT ppx: Mechanical only.  7) continue to follow for culture result.  Patient is cleared  from orthopedic standpoint to be discharged once infectious disease establishes antibiotic plan.  Patient will follow-up as outpatient in 2 weeks.  Patient's postoperative pain medication was sent to the patient's pharmacy.  Infectious disease will manage outpatient antibiotics.        Ryder Kwok PA-C  [facility]  Orthopedic Surgery  11/15/2024  7:23 AM

## 2024-11-15 NOTE — ANESTHESIA PREPROCEDURE EVALUATION
PRE-OP EVALUATION    Patient Name: Cara Nguyễn    Admit Diagnosis: ABSCESS; MASS OF JOINT    Pre-op Diagnosis: ABSCESS; MASS OF JOINT    RIGHT SHOULDER IRRIGATION AND DEBRIDEMENT, REMOVAL OF IMPLANT DEEP, POSSIBLE ARTHROTOMY WITH DRAINAGE    Anesthesia Procedure: RIGHT SHOULDER IRRIGATION AND DEBRIDEMENT, REMOVAL OF IMPLANT DEEP, POSSIBLE ARTHROTOMY WITH DRAINAGE (Right: Shoulder)    Surgeons and Role:     * Tommie Rasheed MD - Primary    Pre-op vitals reviewed.  Temp: 98.8 °F (37.1 °C)  Pulse: 65  Resp: 65  BP: 134/92  SpO2: 100 %  Body mass index is 28.35 kg/m².    Current medications reviewed.  Hospital Medications:   lactated ringers infusion   Intravenous Continuous    ceFAZolin (Ancef) 2 g/10mL IV syringe premix           Outpatient Medications:   Prescriptions Prior to Admission[1]    Allergies: Patient has no known allergies.      Anesthesia Evaluation    Patient summary reviewed.    Anesthetic Complications  (-) history of anesthetic complications         GI/Hepatic/Renal    Negative GI/hepatic/renal ROS.                             Cardiovascular        Exercise tolerance: good     MET: >4      (+) hypertension                       (-) angina              Endo/Other                                  Pulmonary    Negative pulmonary ROS.           (-) shortness of breath            Neuro/Psych    Negative neuro/psych ROS.                                  Past Surgical History:   Procedure Laterality Date    Arthroscopy of joint unlisted Right     x 3    Tonsillectomy       Social History     Socioeconomic History    Marital status: Single   Tobacco Use    Smoking status: Never    Smokeless tobacco: Never   Vaping Use    Vaping status: Never Used   Substance and Sexual Activity    Alcohol use: Yes     Alcohol/week: 3.0 standard drinks of alcohol     Types: 3 Cans of beer per week    Drug use: Never     History   Drug Use Unknown     Available pre-op labs reviewed.                Airway      Mallampati: II  Mouth opening: 3 FB  TM distance: 4 - 6 cm  Neck ROM: full Cardiovascular      Rhythm: regular  Rate: normal     Dental             Pulmonary      Breath sounds clear to auscultation bilaterally.               Other findings              ASA: 2   Plan: general and regional  NPO status verified and patient meets guidelines.    Post-procedure pain management plan discussed with surgeon and patient.    Comment: I explained benefits of brachial plexus blocks to Cara Nguyễn including significant (but unlikely complete) pain relief following the surgical procedure, decreased need for postoperative opioid use. Realistic expectation for block duration was discussed as well. I also explained possible downsides of peripheral nerve blocks including failed block, phrenic block, facial droop, prolonged nerve blockade leading to prolonged numbness in upper extremity and hand, with possible muscle weakness necessitating arm sling. Other very rare complications such as local anesthetic systemic toxicity (LAST), infection, hematoma formation, and permanent nerve damage was also discussed. All questions were answered and patient demonstrated understanding of realistic expectations and risks of peripheral nerve blocks, and wishes to proceed with the procedure. Sites of block were marked by me with patient confirmation.       Plan/risks discussed with: patient and significant other                Present on Admission:  **None**             [1]   No medications prior to admission.

## 2024-11-15 NOTE — OPERATIVE REPORT
Pre-Operative Diagnosis: RIGHT SHOULDER ABSCESS; INFECTION OF DEEP IMPLANT     Post-Operative Diagnosis: RIGHT SHOULDER ABSCESS; INFECTION OF DEEP IMPLANT      Procedure Performed:   RIGHT SHOULDER SURGICAL SCAR REVISION, IRRIGATION AND DEBRIDEMENT, REMOVAL OF IMPLANT DEEP     Surgeons and Role:     * Tommie Rasheed MD - Primary     Assistant(s):  PA: Ryder Kwok PA-C     Skilled assistance was needed for patient positioning, prepping and draping, instrument holding and passing, retracting, and suturing.     Surgical Findings: Abscess involving superior aspect of prior ligament reconstruction with bone involvement of adjacent clavicle without extension deep to the joint or to the AC joint     Specimen: Deep culture x3     Estimated Blood Loss: Blood Output: 100 mL (11/14/2024  8:02 PM)    Indication for Procedure:  Cara Nguyễn is a 29-year-old male with a prior history of right CC ligament injury that occurred approximately 1 year ago who previously underwent CC ligament reconstruction with semitendinosus allograft and concomitant open biceps tenodesis.  Patient was doing well in his initial postoperative course until he developed a recurrent area of fluctuant prominence to the medial aspect of the clavicle for which she underwent I&D with ganglion cyst excision on Shikha 3 after which he had issues with wound drainage for which she underwent a subsequent I&D on July 1.  He was on 6 weeks of dual oral antibiotics thereafter with improvement in overall appearance but did have reoccurrence of medial clavicular swelling.  We had had a prolonged discussion regarding ongoing treatment options given the concern for recurrent infection in the shoulder and had previously planned for open I&D in early September, however, patient had been feeling he had been progressing activity well and symptoms were very minimal and wanted to continue observational care.  He had recurrence of swelling and worsening pain last  month and aspiration of the recurrent ganglion performed in office was again concerning for possible infectious etiology.  We discussed that at this time, the best plan moving forward would be for open removal of reconstruction allograft tissue with all deep implants and full antibiotic course.  Risk, benefits, and alternatives to surgery were discussed in detail with the patient and informed consent was obtained.    Operative Summary:  The patient was met in the preoperative holding area where the correct side and correct site were confirmed and the surgical site was marked.  Patient was transferred to the operating room and placed supine on a standard operating room table.  An interscalene block was performed by the anesthesia team followed by administration of general anesthesia.  Patient was placed into a modified beachchair position and the right upper extremity was prepped and draped in usual sterile fashion.  A WHO timeout was performed to confirm correct site, correct side, correct patient, correct procedure, and that preoperative antibiotics were being held pending deep cultures.    Patient's prior anterior shoulder incision was utilized in its entirety.  Given the mild hypertrophy present to the visualized skin incision, borders of the incision scar were delineated and the incision was excised through its entire length.  Dissection was then taken down through interval scar tissue to the anterior aspect of the deltoid.  Full-thickness skin flaps were raised both medially and laterally within the surgical field.  With progression wound medially, the area of palpable fluctuance was encountered and consolidation of purulent discharge was visualized.  2 deep cultures were taken and sent to the laboratory for aerobic and anaerobic culture.  This seemed to be overlying the prior area of CC ligament reconstruction.  Antibiotic were given once cultures were obtained.  Dissection was carried with the use of Bovie  electrocautery through prior scar tissue implants distally to the anterior aspect of the pectoralis fascia.  The excision was then extended distally for an additional approximately 4 cm.  Standard deltopectoral interval for access to both the superior and inferior aspects of the coracoid given graft placement.  The deltopectoral interval was then developed with the use of dissection out of the cephalic vein for guidance given the modified scar tissue planes.  The subdeltoid space was developed and adhesions were broken up.  Dissection was carried just lateral to the conjoined tendon and the subconjoined recess was entered.  Attention was then turned to removal of the deep implant and CC ligament reconstruction allograft.  Given the location of the implant and the significant scar tissue that was present in its adjacent area, the anterior aspect of the deltoid was reflected off of the anterior clavicle adjacent to the reconstruction zone.  The semitendinosus allograft that had been used for the reconstruction was then dissected out of identifiable scar tissue planes though significant abundant scar tissue was present that limited the discrete delineation of exact planes.  The previously placed reconstruction suture tapes were both removed in their entirety and there was noted purulence around these tapes as such both were placed into a specimen cup and sent to the lab for culture.  Dissection was then carried deep into the subdeltoid space between the clavicle and the coracoid and the semitendinosus allograft tissue was further delineated and removed.  There were identified changes noted to the clavicle adjacent to the reconstruction tissue concerning for infectious extension to the bone.  Small area of hypertrophic mineralization of the cortex was debrided with the use of rongeur and rasp.  Curette was used to roughen up the surface and clean any area of deep bony tissue.  Wound was then irrigated with 12 L of  normal saline solution both in superior and inferior intervals deep to the deltoid superior to the coracoid as well as a normal subcoracoid deltopectoral recess.  There was marked improvement in the overall appearance of the surgical field tissue bed.  Hemostasis was maintained with electrocautery.  Attention was then turned to closure.  Surgical wound was closed in layers with use of monofilament suture given the presence of infection.  0 PDS was used to close both the deltopectoral interval as well as reattach the deltoid musculature off of the anterior clavicle.  Skin was then closed with 3-0 Monocryl and 3-0 nylon.  Sterile dressing was applied with Xeroform, 4 x 4 gauze, ABDs, and foam tape.  Patient was awakened from general anesthesia with no immediate complications appreciated.  Patient will be admitted to the inpatient floor given the complex nature of the infection encountered at the time of surgery for IV antibiotics and infectious disease consultation to help guide antibiotic course moving forward.    Tommie Rasheed MD  11/14/2024

## 2024-11-15 NOTE — PLAN OF CARE
Patient is alert and oriented x4. VSS on RA. Pain controlled at rest. Still with numbness to R upper extremity. Pulses intact. Moderate hand , Motor strength intact but limited d/t block.  Foam tape dressing. IS and ankle pumps encouraged. Belongings within reach. Encouraged to call for any needs.

## 2024-11-15 NOTE — DISCHARGE INSTRUCTIONS
You are scheduled to receive IV antibiotic teaching, weekly PICC line care and lab draws at the [facility] ambulatory infusion suite:  [address] [place] [address], [address]  [place], [place] [address] [phone]  Your first appointment will be on Tuesday 11/19/24.  You will receive a call with appointment details.      Tommie Rasheed MD  [facility]  Orthopaedic Surgery - Sports Medicine        Post Operative Instructions:      SLING / MOVEMENT  You were provided with a sling following surgery to provide support to the arm following the nerve block that was placed to help with post-operative pain.  Once you have regained control of the arm and the nerve block has completely worn off, you can remove the sling and move the shoulder, elbow, wrist and hand as you can tolerate. Avoid any lifting more than 2-3 lbs with the right shoulder.      Physical therapy can begin tomorrow to work to maintain the improvements in range of motion that were obtained with surgery.     ICE  You should use ice packs over the surgical site regularly throughout the day to help decrease swelling and pain. Make sure to have a layer between the ice and your knee so as to not injury your skin. Icing should be performed at intervals of 20 minutes on and 20 minutes off.     MEDICATIONS  Nearly all patients will receive a nerve block for pain control immediately following surgery. It will wear off over 18-24 hours. During this time, you will have little to no feeling in the body part where you had surgery (i.e. the arm). To control your pain during the transition while the nerve block is wearing off, you should start the use of your pain medication, Norco, as you feel is needed.       24 hours after surgery, you should supplement your pain medication with the anti-inflammatory that was prescribed for you. This can help you wean from the narcotic pain medication.     An anti-nausea medication, Zofran, was prescribed for you and should be taken on an  as needed basis, as the pain medication can cause nausea. To minimize this side effect, you should take your pain medication with some food.     DRESSING / BANDAGES:  Keep your surgical dressing clean and dry. You may remove your bandages 5 days after surgery. Please place waterproof band-aids over the incision sites. At this time, you may take a shower, however, you should avoid direct contact to the incisions. Please keep the incisions clean and dry. Do no scrub the incision sites with soap or apply lotion to the operative area. Keep the incisions clean and dry.     Do not take a bath or submerge your shoulder in water until your incisions are checked by me at your post-operative appointment and fully healed with all stitches removed. This is typically 2-3 weeks after surgery.     TEMPERATURE  It is normal to have an elevated temperature during the first 2-3 days post-operatively. Please call our office if your temperature is above 101F, if there is increased redness around the incision sites, or if there is increased drainage from the incision sites.     APPOINTMENT  It is likely that my surgical scheduler, Nadia, has already scheduled a post-operative visit for you. This should occur 2 weeks after surgery.

## 2024-11-15 NOTE — PROGRESS NOTES
Patient received on unit alert and oriented x4.  Pt oriented to room, call light and plan of care.  Pt s/p right shoulder I and D.  Pt has numbness and limited movement of right thumb, denies n/t of right upper extremity, able to move fingers, <3 sec cap refill to right digits.  +2 radial pulses, +2 pedal pulses.  Dressing/ microfoam tape c/d/I, sling in place. Scds placed on patient, family at bedside, up to date on plan of care, bed in lowest position, call light within reach. Dr. Al notified of consult.

## 2024-11-15 NOTE — BRIEF OP NOTE
Pre-Operative Diagnosis: ABSCESS; MASS OF JOINT     Post-Operative Diagnosis: ABSCESS; MASS OF JOINT      Procedure Performed:   RIGHT SHOULDER IRRIGATION AND DEBRIDEMENT, REMOVAL OF IMPLANT DEEP, Revision of surgical scar    Surgeons and Role:     * Tommie Rasheed MD - Primary    Assistant(s):  PA: Ryder Kwok PA-C    Skilled assistance was needed for patient positioning, prepping and draping, instrument holding and passing, retracting, and suturing.     Surgical Findings: Abscess involving superior aspect of prior ligament reconstruction with bone involvement of adjacent clavicle without extension deep to the joint or to the AC joint     Specimen: Deep culture x3     Estimated Blood Loss: Blood Output: 100 mL (11/14/2024  8:02 PM)    Tommie Rasheed MD  11/14/2024  8:17 PM

## 2024-11-15 NOTE — PLAN OF CARE
Pt drowsy, but arouseable to voice.  +csm to right hand.  Right upper extremity sling in place.  Bed in lowest position, call light within reach.

## 2024-11-16 LAB
CREAT BLD-MCNC: 0.88 MG/DL
EGFRCR SERPLBLD CKD-EPI 2021: 119 ML/MIN/1.73M2 (ref 60–?)
VANCOMYCIN PEAK SERPL-MCNC: 48 UG/ML (ref 30–50)
VANCOMYCIN TROUGH SERPL-MCNC: 11.5 UG/ML (ref 10–20)

## 2024-11-16 PROCEDURE — 80202 ASSAY OF VANCOMYCIN: CPT | Performed by: ORTHOPAEDIC SURGERY

## 2024-11-16 PROCEDURE — 82565 ASSAY OF CREATININE: CPT | Performed by: ORTHOPAEDIC SURGERY

## 2024-11-16 RX ORDER — VANCOMYCIN HYDROCHLORIDE
1250 EVERY 12 HOURS
Status: DISCONTINUED | OUTPATIENT
Start: 2024-11-16 | End: 2024-11-17

## 2024-11-16 NOTE — PLAN OF CARE
A&Ox4. VSS on RA. Pain managed with PO medication. Dressing to right shoulder C/D/I. Voiding freely. Last BM 11/15. Call light in reach. SCDs on. Cultures pending. Continue IV ABX. PICC to be placed by vascular.

## 2024-11-16 NOTE — PROGRESS NOTES
[facility]  Orthopedic Surgery  Progress Note    Cara Dimasmanololandy Patient Status:  Inpatient    8/10/1995 MRN [id number]   [facility] 3SW-A Attending Tommie Rasheed MD   Hosp Day # 2 PCP JOSE RAUL DIAZ, LIA     SUBJECTIVE:  INTERVAL HISTORY:  2 S/P Procedure(s):  RIGHT SHOULDER IRRIGATION AND DEBRIDEMENT, REMOVAL OF IMPLANT DEEP, Revision of surgical scar Right .  Patient has no current complaints. Patient denies chest pain and shortness of breath.    OBJECTIVE:  Blood pressure 126/74, pulse 66, temperature 97.9 °F (36.6 °C), temperature source Oral, resp. rate 16, height 5' 9\" (1.753 m), weight 192 lb (87.1 kg), SpO2 94%.     ORTHO EXAM:  Right upper extremity:  Neurovascular intact, mild edema.  Biceps soft, non-tender, no other signs of DVT.  Moves hand and fingers without pain.  Surgical compressive dressing intact, incision intact with sutures, no errythemia present.  Comfortable in shoulder immobilizer.     PAIN: mild controlled well with oral medication.      LABORATORY:  No results for input(s): \"RBC\", \"HGB\", \"HCT\", \"MCV\", \"MCH\", \"MCHC\", \"RDW\", \"NEPRELIM\", \"WBC\", \"PLT\" in the last 168 hours.   No results for input(s): \"PTP\", \"INR\" in the last 168 hours.      ASSESSMENT/PLAN:  Continue pain management   ID following, awaiting final cultures.  Change dressing today, then daily.  Discharge planning: HOME WITH HOME HEALTH  Continue medical management      MISAEL Art  2024  9:11 AM

## 2024-11-16 NOTE — PROGRESS NOTES
Infectious Disease Progress Note      Date of admission: 11/14/2024  3:44 PM     Reason for consult: Right shoulder infection    Referring physician: Tommie Rasheed MD    Subjective: The patient is feeling well.  Pain under control.  No nausea or vomiting.  No diarrhea.  No shortness of breath.  No cough or sputum production.    The rest of the systems were reviewed and found to be negative except was mentioned above    Interval events: This is a 29-year-old male patient, admitted here with right shoulder infection in the setting of a right shoulder repair back in 2023.  Now status post debridement with removal of graft and saturates except for a small piece of the graft over the neurovascular bundle.  Cultures are pending.  Currently on ceftriaxone and vancomycin      Medications:    sennosides    docusate sodium    polyethylene glycol (PEG 3350)    magnesium hydroxide    bisacodyl    fleet enema    ondansetron    prochlorperazine    multivitamin    oxyCODONE **OR** oxyCODONE    HYDROmorphone **OR** HYDROmorphone    cefTRIAXone    vancomycin     Allergies:  Allergies[1]    Physical Exam:  Vitals:    11/16/24 0800   BP: 126/74   Pulse: 66   Resp: 16   Temp: 97.9 °F (36.6 °C)     Vitals signs and nursing note reviewed.   Constitutional:       Appearance: Normal appearance.   HENT:      Head: Normocephalic and atraumatic.      Mouth: Mucous membranes are moist.   Neck:      Musculoskeletal: Neck supple.   Cardiovascular:      Rate and Rhythm: Normal rate.   Pulmonary:      Effort: Pulmonary effort is normal. No respiratory distress.   Skin:     General: Skin is warm and dry.   Neurological:      General: No focal deficit present.      Mental Status: Alert and oriented to person, place, and time.       Laboratory data:  I have reviewed all the lab results independently.  Lab Results   Component Value Date    CREATSERUM 0.88 11/16/2024      No results for input(s): \"RBC\", \"HGB\", \"HCT\", \"MCV\", \"MCH\", \"MCHC\", \"RDW\",  \"NEPRELIM\", \"WBC\", \"PLT\", \"NEUT\", \"LYMPH\", \"MON\", \"EOS\", \"NRBC\" in the last 168 hours.   Microbiology data:  Hospital Encounter on 11/14/24   1. Anaerobic Culture     Status: None (Preliminary result)    Collection Time: 11/14/24  6:30 PM    Specimen: Shoulder, right; Tissue   Result Value Ref Range    Anaerobic Culture No Anaerobes to date N/A     Impression:  Cara Nguyễn is a 29 year old male with    Right shoulder infection, with infected graft with threat to limb  This is in the setting of a shoulder repair back in the summer 2023 with a graft and sutures  Now the graft was removed except for a very small part that was left very close to his neurovascular bundle  Status post I&D debridement and explant of prosthesis on 11/14/2024  Cultures pending  Currently on ceftriaxone and vancomycin    Recommendations:    Continue to follow-up on surgical cultures, will need to be held for 3 weeks.  I have called the lab and ask for the cultures to be held and susceptibilities ran on one of her grows.  Continue IV ceftriaxone and IV vancomycin  PICC line ordered  The patient will need 6 weeks of IV antibiotics through 12/25/2024  If nothing grows on the cultures by Monday, we will plan on discharging him on ceftriaxone and vancomycin and have him follow-up with us in the next week to go over the cultures.  Continue to monitor daily labs for antibiotic toxicities  Further recommendations will depend on the above work-up and clinical progress     The plan of care was discussed with the primary hospital team, Tommie Rasheed MD     Recommendations were also discussed with the patient; all questions were answered.     Thank you for this consultation. Please don't hesitate to call the ID team for questions or any acute changes in patient's clinical condition.    Please note that this report has been produced using speech recognition software and may contain errors related to that system including, but not limited to,  errors in grammar, punctuation, and spelling, as well as words and phrases that possibly may have been recognized inappropriately.  If there are any questions or concerns, contact the dictating provider for clarification.    The  [employer] Cures Act makes medical notes like these available to patients in the interest of transparency. Please be advised this is a medical document. Medical documents are intended to carry relevant information, facts as evident, and the clinical opinion of the practitioner. The medical note is intended as peer to peer communication and may appear blunt or direct. It is written in medical language and may contain abbreviations or verbiage that are unfamiliar.     Nam Lawrence MD  [facility]. Tel: [phone]. Fax: [phone].     Cara Nguyễn : 8/10/1995 MRN: [id number] CSN: [id number]          [1] No Known Allergies

## 2024-11-17 LAB
CK SERPL-CCNC: 142 U/L
CREAT BLD-MCNC: 0.8 MG/DL
EGFRCR SERPLBLD CKD-EPI 2021: 123 ML/MIN/1.73M2 (ref 60–?)

## 2024-11-17 PROCEDURE — 82550 ASSAY OF CK (CPK): CPT | Performed by: INTERNAL MEDICINE

## 2024-11-17 PROCEDURE — 82565 ASSAY OF CREATININE: CPT | Performed by: ORTHOPAEDIC SURGERY

## 2024-11-17 RX ORDER — ACETAMINOPHEN 500 MG
1000 TABLET ORAL EVERY 6 HOURS PRN
Status: DISCONTINUED | OUTPATIENT
Start: 2024-11-17 | End: 2024-11-18

## 2024-11-17 RX ORDER — DAPTOMYCIN 50 MG/ML
10 INJECTION, POWDER, LYOPHILIZED, FOR SOLUTION INTRAVENOUS EVERY 24 HOURS
Qty: 78 EACH | Refills: 0 | Status: SHIPPED | OUTPATIENT
Start: 2024-11-17 | End: 2024-12-26

## 2024-11-17 RX ORDER — ACETAMINOPHEN 500 MG
500 TABLET ORAL EVERY 6 HOURS PRN
Status: DISCONTINUED | OUTPATIENT
Start: 2024-11-17 | End: 2024-11-18

## 2024-11-17 RX ORDER — VANCOMYCIN HYDROCHLORIDE
1.25 EVERY 12 HOURS
Qty: 19500 ML | Refills: 0 | Status: SHIPPED | OUTPATIENT
Start: 2024-11-17 | End: 2024-11-17

## 2024-11-17 RX ORDER — CEFTRIAXONE SODIUM 2 G/50ML
2 INJECTION, SOLUTION INTRAVENOUS EVERY 24 HOURS
Qty: 1950 ML | Refills: 0 | Status: SHIPPED | OUTPATIENT
Start: 2024-11-17 | End: 2024-12-26

## 2024-11-17 RX ORDER — CEFTRIAXONE SODIUM 2 G/50ML
2 INJECTION, SOLUTION INTRAVENOUS EVERY 24 HOURS
Qty: 1950 ML | Refills: 0 | Status: SHIPPED | OUTPATIENT
Start: 2024-11-17 | End: 2024-11-17

## 2024-11-17 NOTE — PLAN OF CARE
Patient A&Ox4. RA. VSS. Right shoulder dressing C/D/I. Denies numbness/tingling. Pain managed with PRN pain medication. Up to bathroom independently, voiding freely. Call light within reach.

## 2024-11-17 NOTE — PROGRESS NOTES
[facility]  Orthopedic Surgery  Progress Note    Cara Nguyễn Patient Status:  Inpatient    8/10/1995 MRN [id number]   [facility] 3SW-A Attending Tommie Rasheed MD   Hosp Day # 3 PCP JOSE RAUL DIAZ, LIA     SUBJECTIVE:  INTERVAL HISTORY:  3 S/P Procedure(s):  RIGHT SHOULDER IRRIGATION AND DEBRIDEMENT, REMOVAL OF IMPLANT DEEP, Revision of surgical scar Right .  Patient has no current complaints. Patient denies chest pain and shortness of breath.    OBJECTIVE:  Blood pressure 133/62, pulse 65, temperature 98.9 °F (37.2 °C), temperature source Oral, resp. rate 16, height 5' 9\" (1.753 m), weight 192 lb (87.1 kg), SpO2 96%.     ORTHO EXAM:  Right upper extremity:  Neurovascular intact, mild edema.  Biceps soft, non-tender, no other signs of DVT.  Moves hand and fingers without pain.  Dressing dry and intact, incision dry and intact with sutures, ecchymosis forming in deltoid, no errythemia present.  Comfortable in shoulder sling.     PAIN: mild/moderate controlled well with oral medication.  Try tylenol during day to help with pain.    LABORATORY:  No results for input(s): \"RBC\", \"HGB\", \"HCT\", \"MCV\", \"MCH\", \"MCHC\", \"RDW\", \"NEPRELIM\", \"WBC\", \"PLT\" in the last 168 hours.   No results for input(s): \"PTP\", \"INR\" in the last 168 hours.      ASSESSMENT/PLAN:  Continue pain management   ID following, awaiting final cultures and PICC placement  Change dressing today, then daily.  Discharge planning: HOME WITH HOME HEALTH  Continue medical management      MISAEL Art  2024  7:55 AM

## 2024-11-17 NOTE — CONSULTS
[facility] Dosing Service      Follow Up Pharmacokinetic Consult for Vancomycin Dosing     Cara Nguyễn is a 29 year old male who is receiving vancomycin therapy for osteomyelitis. Patient is on day 2 of vancomycin and is currently receiving 1250 mg IV every 8 hours. The current treatment and monitoring approach is steady state AUC strategy.        Weight and Temperature:    Wt Readings from Last 1 Encounters:   24 87.1 kg (192 lb)         Temp Readings from Last 1 Encounters:   24 99.1 °F (37.3 °C) (Oral)      Labs:   Recent Labs   Lab 24  2238 11/15/24  0443 24  0428   CREATSERUM 0.92 0.96 0.88      Estimated Creatinine Clearance: 123.9 mL/min (based on SCr of 0.88 mg/dL).     No results for input(s): \"WBC\" in the last 168 hours.     Vancomycin Levels:  Lab Results   Component Value Date/Time    VANCT 11.5 2024 05:09 PM    VANCP 48.0 2024 11:27 AM       Corresponding 24 h-AUC:  729 mg-h/L     The Pharmacokinetic Target is:     to 600 mg-h/L and trough <=15 mg/L    Renal Dosing Considerations:    None     Assessment/Plan:   Maintenance Regimen: Adjust vancomycin to 1250 mg IV every 12 hours.  The predicted AUC and trough with this new regimen are 4 mg-h/L and 481 mg/L, respectively    Monitorin) Plan for vancomycin peak and trough to be obtained at steady state    2) Pharmacy will order SCr as clinically indicated to assess renal function.    3) Pharmacy will monitor for toxicity and efficacy, adjust vancomycin dose and/or frequency, and order vancomycin levels as appropriate per the Pharmacy and Therapeutics Committee approved protocol until discontinuation of the medication.       We appreciate the opportunity to assist in the care of this patient.     Erinn Lopez, PharmD  2024  7:10 PM  [facility]  Pharmacy Extension: [phone]

## 2024-11-17 NOTE — PLAN OF CARE
Patient POD 2 I&D R shoulder.  Medications given as ordered. Pain relieved with PRN Tylenol and ice packs. Plan for PICC and dc on IV antibx.

## 2024-11-17 NOTE — PROGRESS NOTES
Infectious Disease Progress Note      Date of admission: 11/14/2024  3:44 PM     Reason for consult: Right shoulder infection    Referring physician: Tommie Rasheed MD    Subjective: The patient is feeling well.  Pain under control.  No nausea or vomiting.  No diarrhea.  No shortness of breath.  No cough or sputum production.  The patient had 1 episode of watery stools this morning; however, he was constipated prior to that and was on stool softeners.    The rest of the systems were reviewed and found to be negative except was mentioned above    Interval events: This is a 29-year-old male patient, admitted here with right shoulder infection in the setting of a right shoulder repair back in 2023.  Now status post debridement with removal of graft and saturates except for a small piece of the graft over the neurovascular bundle.  Cultures are pending.  Currently on ceftriaxone and vancomycin      Medications:    acetaminophen **OR** acetaminophen    vancomycin    sennosides    docusate sodium    polyethylene glycol (PEG 3350)    magnesium hydroxide    bisacodyl    fleet enema    ondansetron    prochlorperazine    multivitamin    oxyCODONE **OR** oxyCODONE    HYDROmorphone **OR** HYDROmorphone    cefTRIAXone     Allergies:  Allergies[1]    Physical Exam:  Vitals:    11/17/24 0800   BP: 129/68   Pulse: 62   Resp: 16   Temp: 98 °F (36.7 °C)     Vitals signs and nursing note reviewed.   Constitutional:       Appearance: Normal appearance.   HENT:      Head: Normocephalic and atraumatic.      Mouth: Mucous membranes are moist.   Neck:      Musculoskeletal: Neck supple.   Cardiovascular:      Rate and Rhythm: Normal rate.   Pulmonary:      Effort: Pulmonary effort is normal. No respiratory distress.   Skin:     General: Skin is warm and dry.   Neurological:      General: No focal deficit present.      Mental Status: Alert and oriented to person, place, and time.       Laboratory data:  I have reviewed all the lab results  independently.  Lab Results   Component Value Date    CREATSERUM 0.80 11/17/2024      No results for input(s): \"RBC\", \"HGB\", \"HCT\", \"MCV\", \"MCH\", \"MCHC\", \"RDW\", \"NEPRELIM\", \"WBC\", \"PLT\", \"NEUT\", \"LYMPH\", \"MON\", \"EOS\", \"NRBC\" in the last 168 hours.   Microbiology data:  Hospital Encounter on 11/14/24   1. Tissue Aerobic Culture     Status: None (Preliminary result)    Collection Time: 11/14/24  6:30 PM    Specimen: Shoulder, right; Tissue   Result Value Ref Range    Tissue Culture Result No Growth 2 Days N/A    Tissue Smear 1+ WBCs seen N/A    Tissue Smear No organisms seen N/A   2. Anaerobic Culture     Status: None (Preliminary result)    Collection Time: 11/14/24  6:30 PM    Specimen: Shoulder, right; Tissue   Result Value Ref Range    Anaerobic Culture No Anaerobes to date N/A     Impression:  Cara Nguyễn is a 29 year old male with    Right shoulder infection, with infected graft with threat to limb  This is in the setting of a shoulder repair back in the summer 2023 with a graft and sutures  Now the graft was removed except for a very small part that was left very close to his neurovascular bundle  Status post I&D debridement and explant of prosthesis on 11/14/2024  Cultures pending  Currently on ceftriaxone and vancomycin    Recommendations:    Continue to follow-up on surgical cultures, will need to be held for 3 weeks.  I have called the lab and ask for the cultures to be held and susceptibilities ran on one of her grows.  Continue IV ceftriaxone and IV vancomycin  PICC line ordered  The patient will need 6 weeks of IV antibiotics through 12/25/2024  If nothing grows on the cultures by Monday, we will plan on discharging him on ceftriaxone 2 g daily and daptomycin 10 mg/kg daily and have him follow-up with us in the next week to go over the cultures.  Scripts placed in the chart  Social work consult placed  He will need to follow-up with me later next week  Continue to monitor daily labs for  antibiotic toxicities  Further recommendations will depend on the above work-up and clinical progress     The plan of care was discussed with the primary hospital team, Tommie Rasheed MD     Recommendations were also discussed with the patient; all questions were answered.     Thank you for this consultation. Please don't hesitate to call the ID team for questions or any acute changes in patient's clinical condition.    Please note that this report has been produced using speech recognition software and may contain errors related to that system including, but not limited to, errors in grammar, punctuation, and spelling, as well as words and phrases that possibly may have been recognized inappropriately.  If there are any questions or concerns, contact the dictating provider for clarification.    The  Century Cures Act makes medical notes like these available to patients in the interest of transparency. Please be advised this is a medical document. Medical documents are intended to carry relevant information, facts as evident, and the clinical opinion of the practitioner. The medical note is intended as peer to peer communication and may appear blunt or direct. It is written in medical language and may contain abbreviations or verbiage that are unfamiliar.     Nam Lawrence MD  [facility]. Tel: [phone]. Fax: [phone].     Cara Nguyễn : 8/10/1995 MRN: [id number] CSN: [id number]          [1] No Known Allergies

## 2024-11-18 ENCOUNTER — APPOINTMENT (OUTPATIENT)
Facility: HOSPITAL | Age: 29
End: 2024-11-18
Attending: INTERNAL MEDICINE
Payer: COMMERCIAL

## 2024-11-18 VITALS
HEART RATE: 56 BPM | RESPIRATION RATE: 18 BRPM | SYSTOLIC BLOOD PRESSURE: 127 MMHG | BODY MASS INDEX: 28.44 KG/M2 | WEIGHT: 192 LBS | TEMPERATURE: 98 F | OXYGEN SATURATION: 95 % | HEIGHT: 69 IN | DIASTOLIC BLOOD PRESSURE: 69 MMHG

## 2024-11-18 LAB
CREAT BLD-MCNC: 0.94 MG/DL
EGFRCR SERPLBLD CKD-EPI 2021: 113 ML/MIN/1.73M2 (ref 60–?)

## 2024-11-18 PROCEDURE — 82565 ASSAY OF CREATININE: CPT | Performed by: ORTHOPAEDIC SURGERY

## 2024-11-18 PROCEDURE — 02HV33Z INSERTION OF INFUSION DEVICE INTO SUPERIOR VENA CAVA, PERCUTANEOUS APPROACH: ICD-10-PCS | Performed by: INTERNAL MEDICINE

## 2024-11-18 PROCEDURE — 36569 INSJ PICC 5 YR+ W/O IMAGING: CPT

## 2024-11-18 RX ORDER — LIDOCAINE HYDROCHLORIDE 10 MG/ML
5 INJECTION, SOLUTION EPIDURAL; INFILTRATION; INTRACAUDAL; PERINEURAL
Status: COMPLETED | OUTPATIENT
Start: 2024-11-18 | End: 2024-11-18

## 2024-11-18 NOTE — PROGRESS NOTES
NURSING DISCHARGE NOTE    Discharged Home via Ambulatory.  Accompanied by Family member  Belongings Taken by patient/family.    Reviewed AVS with patient, understanding verbalized, PIV removed, PICC intact, will go to [facility] teaching appointment for abx. All questions answered.

## 2024-11-18 NOTE — PLAN OF CARE
Patient is Aox4, VSS on RA, pain well controlled, No N/T, Hand  and motor strength intact, coverlet to shoulder CDI, PICC placed to LUE, Abx as ordered, likely DC after MD rounding.

## 2024-11-18 NOTE — PLAN OF CARE
A&O4. VSS. RA. Right shoulder dressing C/D/I. Pain managed with PRN Tylenol. Up ad elvin. Call light within reach.

## 2024-11-18 NOTE — PROGRESS NOTES
[facility]   part of [facility] Infectious Disease Progress Note    Cara Dimasmanololandy Patient Status:  Inpatient    8/10/1995 MRN [id number]   Location [facility] 3SW-A Attending Tommie Rasheed MD   Hosp Day # 4 PCP LIA BLUNT MD     Subjective:  Chart reviewed, no acute events.  Pt seen bedside. PICC just placed.  He is doing well.  Denies pain in shoulder.  Hopeful for DC today.  No complaints    Objective:    Allergies:  Allergies[1]    Medications:    Current Facility-Administered Medications:     acetaminophen (Tylenol Extra Strength) tab 500 mg, 500 mg, Oral, Q6H PRN **OR** acetaminophen (Tylenol Extra Strength) tab 1,000 mg, 1,000 mg, Oral, Q6H PRN    vancomycin (Vancocin) 1,000 mg in sodium chloride 0.9% 250 mL IVPB-ADDV, 1,000 mg, Intravenous, Q8H    sennosides (Senokot) tab 17.2 mg, 17.2 mg, Oral, Nightly    docusate sodium (Colace) cap 100 mg, 100 mg, Oral, BID    polyethylene glycol (PEG 3350) (Miralax) 17 g oral packet 17 g, 17 g, Oral, Daily PRN    magnesium hydroxide (Milk of Magnesia) 400 MG/5ML oral suspension 30 mL, 30 mL, Oral, Daily PRN    bisacodyl (Dulcolax) 10 MG rectal suppository 10 mg, 10 mg, Rectal, Daily PRN    fleet enema (Fleet) rectal enema 133 mL, 1 enema, Rectal, Once PRN    ondansetron (Zofran) 4 MG/2ML injection 4 mg, 4 mg, Intravenous, Q6H PRN    prochlorperazine (Compazine) 10 MG/2ML injection 5 mg, 5 mg, Intravenous, Q8H PRN    multivitamin (Tab-A-Shakira/Beta Carotene) tab 1 tablet, 1 tablet, Oral, Daily    oxyCODONE immediate release partial tab 2.5 mg, 2.5 mg, Oral, Q4H PRN **OR** oxyCODONE immediate release tab 5 mg, 5 mg, Oral, Q4H PRN    HYDROmorphone (Dilaudid) 1 MG/ML injection 0.2 mg, 0.2 mg, Intravenous, Q2H PRN **OR** HYDROmorphone (Dilaudid) 1 MG/ML injection 0.4 mg, 0.4 mg, Intravenous, Q2H PRN    cefTRIAXone (Rocephin) 2 g in sodium chloride 0.9% 100 mL IVPB-ADDV, 2 g, Intravenous, Q24H    Physical Exam:  General: Alert, orientated  x3.  Cooperative.  No apparent distress.  Vital Signs:  Blood pressure 127/69, pulse 56, temperature 97.6 °F (36.4 °C), temperature source Oral, resp. rate 18, height 5' 9\" (1.753 m), weight 192 lb (87.1 kg), SpO2 95%.   Temp (24hrs), Av.1 °F (36.7 °C), Min:97.6 °F (36.4 °C), Max:98.4 °F (36.9 °C)      HEENT: Exam is unremarkable.  Without scleral icterus.  Mucous membranes are moist. PERRLA.  Oropharynx is clear.  Lungs: Clear to auscultation bilaterally.  Cardiac: Regular rate   Abdomen:  Soft, non-distended, non-tender, with no rebound or guarding.    Extremities: R shoulder dressing in place, no breakthrough drainage. No erythema or swelling around bandage. No TTP  Skin: Normal texture and turgor.  Neurologic: Cranial nerves are grossly intact.      Labs:  Lab Results   Component Value Date    CREATSERUM 0.94 2024       Radiology:      Assessment/Plan:    1.  Right shoulder PJI  -hx of R shoulder repair in  with graft and sutures  -admitted with  -s/p I&D with explant of prosthesis and graft removal except for very small part left close to neurovascular bundle on 24   -cultures remain negative  -on IV Ceftriaxone and Vancomycin    DISPO:  PICC in place.  Stable for DC from ID standpoint.  Rx left yesterday for IV Daptomycin 10mg/kg and IV Ceftriaxone 2gm daily through 24 with weekly CBC, CMP, CRP and ESR.  He will follow up with Dr Lawrence later this week.  Rx left for one time dose of Daptomycin to be given prior to DC.   D/w patient, family and Dr Lawrence     If you have any questions or concerns please call [facility] at [phone].     ZIGGY Watts  2024  10:08 AM         [1] No Known Allergies

## 2024-11-18 NOTE — CM/SW NOTE
11/18/24 1100   CM/SW Referral Data   Referral Source Social Work (self-referral)   Reason for Referral Discharge planning   Informant Patient;Spouse/Significant Other;EMR;Clinical Staff Member   Patient Info   Patient's Current Mental Status at Time of Assessment Alert;Oriented   Patient lives with Spouse/Significant other   Patient Status Prior to Admission   Independent with ADLs and Mobility Yes   Discharge Needs   Anticipated D/C needs Infusion care       Patient is a 30 y/o man admitted with shoulder abscess now s/p I&D.  Order received for patient needing IV antibiotics at [place] - daily dapto and rocephin.  Referral sent to [facility] via [facility].  Confirmation received that pt is covered at 100% and able to go to their [facility] for teaching and weekly line care/labs.    Met with pt and his fiancee at bedside to discuss DC planning.  Reviewed options for infusion/IV abx: 1) nursing home, 2) home with home health care 3) home with in office teach and train vs 4) outpt infusion at MD office or infusion center.  Discussed that home infusion may include out of pocket costs and/or require patient to be homebound and also that infusion center can only accommodate once daily infusion.  Patient does not want to go to [facility], is not homebound and does not wish to go to outpatient infusion center daily.  He is agreeable with home IV antibiotics with teaching, line care and labs at [facility].  He stated preference for their [place] location.  He is eager for discharge today.    Updated RN.  Pt will need dose of dapto prior to discharge.  Noted rocephin due at 2200.  RN to contact MD to confirm if dose can be skipped or moved up.  Anticipate DC today.      Updated [facility] via [facility].  All orders/clinical sent.  / to remain available for support and/or discharge planning.     Irma Roche, [facility]  Discharge Planner  [phone]

## 2024-11-18 NOTE — PAYOR COMM NOTE
Discharge Notification    Patient Name: MALENA RODRIGUEZ  Payor: LIYAH CASTRO POS/HANNAHNP  Subscriber #: [id number]  Authorization Number: [id number]  Admit Date/Time: 11/14/2024 3:44 PM  Discharge Date/Time: 11/18/2024 2:31 PM

## 2024-11-20 NOTE — PAYOR COMM NOTE
RECONSIDERATION REQUEST FOR INPATIENT SERVICES 11/17-11/18      --------------  CONTINUED STAY REVIEW    Payor: LIYAH CASTRO POS/ALEXIS  Subscriber #:  [id number]  Authorization Number: [id number]    Admit date: 11/14/24  Admit time:  9:30 PM    REVIEW DOCUMENTATION:  11/17 Ortho    SUBJECTIVE:  INTERVAL HISTORY:  3 S/P Procedure(s):  RIGHT SHOULDER IRRIGATION AND DEBRIDEMENT, REMOVAL OF IMPLANT DEEP, Revision of surgical scar Right .  Patient has no current complaints. Patient denies chest pain and shortness of breath.     OBJECTIVE:  Blood pressure 133/62, pulse 65, temperature 98.9 °F (37.2 °C), temperature source Oral, resp. rate 16, height 5' 9\" (1.753 m), weight 192 lb (87.1 kg), SpO2 96%.      ORTHO EXAM:  Right upper extremity:  Neurovascular intact, mild edema.  Biceps soft, non-tender, no other signs of DVT.  Moves hand and fingers without pain.  Dressing dry and intact, incision dry and intact with sutures, ecchymosis forming in deltoid, no errythemia present.  Comfortable in shoulder sling.      PAIN: mild/moderate controlled well with oral medication.  Try tylenol during day to help with pain.     LABORATORY:  No results for input(s): \"RBC\", \"HGB\", \"HCT\", \"MCV\", \"MCH\", \"MCHC\", \"RDW\", \"NEPRELIM\", \"WBC\", \"PLT\" in the last 168 hours.   No results for input(s): \"PTP\", \"INR\" in the last 168 hours.        ASSESSMENT/PLAN:  Continue pain management   ID following, awaiting final cultures and PICC placement  Change dressing today, then daily.  Discharge planning: HOME WITH HOME HEALTH  Continue medical management            11/17 ID      Reason for consult: Right shoulder infection     Referring physician: Tommie Rasheed MD     Subjective: The patient is feeling well.  Pain under control.  No nausea or vomiting.  No diarrhea.  No shortness of breath.  No cough or sputum production.  The patient had 1 episode of watery stools this morning; however, he was constipated prior to that and was on stool softeners.     The  rest of the systems were reviewed and found to be negative except was mentioned above     Interval events: This is a 29-year-old male patient, admitted here with right shoulder infection in the setting of a right shoulder repair back in 2023.  Now status post debridement with removal of graft and saturates except for a small piece of the graft over the neurovascular bundle.  Cultures are pending.  Currently on ceftriaxone and vancomycin    Impression:  Cara Nguyễn is a 29 year old male with     Right shoulder infection, with infected graft with threat to limb  This is in the setting of a shoulder repair back in the summer 2023 with a graft and sutures  Now the graft was removed except for a very small part that was left very close to his neurovascular bundle  Status post I&D debridement and explant of prosthesis on 11/14/2024  Cultures pending  Currently on ceftriaxone and vancomycin     Recommendations:    Continue to follow-up on surgical cultures, will need to be held for 3 weeks.  I have called the lab and ask for the cultures to be held and susceptibilities ran on one of her grows.  Continue IV ceftriaxone and IV vancomycin  PICC line ordered  The patient will need 6 weeks of IV antibiotics through 12/25/2024  If nothing grows on the cultures by Monday, we will plan on discharging him on ceftriaxone 2 g daily and daptomycin 10 mg/kg daily and have him follow-up with us in the next week to go over the cultures.  Scripts placed in the chart  Social work consult placed  He will need to follow-up with me later next week  Continue to monitor daily labs for antibiotic toxicities  Further recommendations will depend on the above work-up and clinical progress     Vitals (last day) before discharge       Date/Time Temp Pulse Resp BP SpO2 Weight O2 Device O2 Flow Rate (L/min) Who    11/18/24 0758 97.6 °F (36.4 °C) 56 18 127/69 95 % -- None (Room air) -- VV    11/18/24 0347 98.4 °F (36.9 °C) 59 16 121/63 98 % --  None (Room air) -- VB    11/17/24 1920 98.2 °F (36.8 °C) 67 16 142/83 95 % -- None (Room air) -- VB    11/17/24 1530 98.1 °F (36.7 °C) 76 16 142/86 95 % -- None (Room air) -- EV    11/17/24 0800 98 °F (36.7 °C) 62 16 129/68 94 % -- None (Room air) -- EV    11/17/24 0358 98.9 °F (37.2 °C) 65 16 133/62 96 % -- None (Room air) -- VB                    --------------  DISCHARGE REVIEW    Payor: LIYAH ORANTES/ALEXIS  Subscriber #:  [id number]  Authorization Number: [id number]    Admit date: 11/14/24  Admit time:   9:30 PM  Discharge Date: 11/18/2024  2:31 PM     Admitting Physician: Tommie Rasheed MD  Attending Physician:  No att. providers found  Primary Care Physician: Kimmy Rouse          Discharge Summary Notes        Discharge Summary signed by Tommie Rasheed MD at 11/20/2024  7:00 AM       Author: Tommie Rasheed MD Specialty: SURGERY, ORTHOPEDIC Author Type: Physician    Filed: 11/20/2024  7:00 AM Date of Service: 11/20/2024  6:58 AM Status: Signed    : Tommie Rasheed MD (Physician)         Discharge Summary  Patient ID:  Cara Nguyễn  [id number]  29 year old  8/10/1995    Admit date: 11/14/2024    Discharge date and time: 11/18/2024    Attending Physician: No att. providers found     Reason for admission: Right shoulder infection    Discharge Diagnoses: ABSCESS; MASS OF JOINT  Abscess of right shoulder    Discharged Condition: good    Hospital Course: Patient admitted to the inpatient unit following right shoulder I&D with removal of hardware.  He was stable following inpatient admission.  He had cultures that were followed and had a consultation from the infectious disease service.  He had antibiotics adjusted and a PICC line placed for planned empiric IV antibiotics while awaiting culture speciation.    Consults: Infectious disease    Diagnostics: None    Procedures: Right shoulder irrigation debridement with removal of deep implant  PICC line placement    Disposition: home    Patient  Instructions:   Discharge Medication List as of 11/18/2024  2:16 PM        START taking these medications    Details   DAPTOmycin Intravenous Recon Soln Inject 900 mg into the vein daily. Weekly CBC with differential and CMP, CPK, sed rate and CRP.  Fax results to [facility]. Fax: [phone]. Tel: [phone].  PICC line care as per protocol., Print Script, Disp-78 each, R-0      HYDROcodone-acetaminophen 5-325 MG Oral Tab Take 1 tablet by mouth every 6 (six) hours as needed., Normal, Disp-16 tablet, R-0      ondansetron (ZOFRAN) 4 mg tablet Take 1 tablet (4 mg total) by mouth every 8 (eight) hours as needed., Normal, Disp-16 tablet, R-0      naproxen 500 MG Oral Tab Take 1 tablet (500 mg total) by mouth 2 (two) times daily with meals., Normal, Disp-42 tablet, R-1           CONTINUE these medications which have CHANGED    Details   cefTRIAXone in dextrose 5% (ROCEPHIN) IVPB premix Inject 50 mL (2 g total) into the vein daily. Weekly CBC with differential and CMP and CPK, sed rate and CRP.  Fax results to [facility]. Fax: [phone]. Tel: [phone].  PICC line care as per protocol., Print Script, Disp-1950 mL, R-0           Activity: no lifting, Driving, or Strenuous exercise while PICC line in place  Diet: regular diet  Wound Care: keep wound clean and dry  Weight Bearing:  WBAT      Follow-up with Tommie Rasheed MD in 2 weeks.  Follow-up scheduled with Dr. Lawrence of infectious disease  Follow-up with labs: PT/INR with the medical physician that followed you in the hospital.      Signed:  Tommie Rasheed MD  11/20/2024  6:58 AM    Electronically signed by Tommie Rasheed MD on 11/20/2024  7:00 AM         REVIEWER COMMENTS

## 2024-11-20 NOTE — DISCHARGE SUMMARY
Discharge Summary  Patient ID:  Cara Nguyễn  [id number]  29 year old  8/10/1995    Admit date: 11/14/2024    Discharge date and time: 11/18/2024    Attending Physician: No att. providers found     Reason for admission: Right shoulder infection    Discharge Diagnoses: ABSCESS; MASS OF JOINT  Abscess of right shoulder    Discharged Condition: good    Hospital Course: Patient admitted to the inpatient unit following right shoulder I&D with removal of hardware.  He was stable following inpatient admission.  He had cultures that were followed and had a consultation from the infectious disease service.  He had antibiotics adjusted and a PICC line placed for planned empiric IV antibiotics while awaiting culture speciation.    Consults: Infectious disease    Diagnostics: None    Procedures: Right shoulder irrigation debridement with removal of deep implant  PICC line placement    Disposition: home    Patient Instructions:   Discharge Medication List as of 11/18/2024  2:16 PM        START taking these medications    Details   DAPTOmycin Intravenous Recon Soln Inject 900 mg into the vein daily. Weekly CBC with differential and CMP, CPK, sed rate and CRP.  Fax results to [facility]. Fax: [phone]. Tel: [phone].  PICC line care as per protocol., Print Script, Disp-78 each, R-0      HYDROcodone-acetaminophen 5-325 MG Oral Tab Take 1 tablet by mouth every 6 (six) hours as needed., Normal, Disp-16 tablet, R-0      ondansetron (ZOFRAN) 4 mg tablet Take 1 tablet (4 mg total) by mouth every 8 (eight) hours as needed., Normal, Disp-16 tablet, R-0      naproxen 500 MG Oral Tab Take 1 tablet (500 mg total) by mouth 2 (two) times daily with meals., Normal, Disp-42 tablet, R-1           CONTINUE these medications which have CHANGED    Details   cefTRIAXone in dextrose 5% (ROCEPHIN) IVPB premix Inject 50 mL (2 g total) into the vein daily. Weekly CBC with differential and CMP and CPK, sed rate and CRP.  Fax  results to [facility]. Fax: [phone]. Tel: [phone].  PICC line care as per protocol., Print Script, Disp-1950 mL, R-0           Activity: no lifting, Driving, or Strenuous exercise while PICC line in place  Diet: regular diet  Wound Care: keep wound clean and dry  Weight Bearing:  WBAT      Follow-up with Tommie Rasheed MD in 2 weeks.  Follow-up scheduled with Dr. Lawrence of infectious disease  Follow-up with labs: PT/INR with the medical physician that followed you in the hospital.      Signed:  Tommie Rasheed MD  11/20/2024  6:58 AM

## (undated) DEVICE — DRAPE,U/SHT,SPLIT,FILM,60X84,STERILE: Brand: MEDLINE

## (undated) DEVICE — SUT PDS II 0 L27IN ABSRB VLT L26MM CT-2

## (undated) DEVICE — GOWN,SIRUS,FABRIC-REINFORCED,X-LARGE: Brand: MEDLINE

## (undated) DEVICE — COVER,LIGHT,CAMERA,HARD,1/PK,STRL: Brand: MEDLINE

## (undated) DEVICE — 3M™ IOBAN™ 2 ANTIMICROBIAL INCISE DRAPE 6648EZ: Brand: IOBAN™ 2

## (undated) DEVICE — SUT COAT VCRL 0 27IN CP-1 ABSRB UD 36MM 1/2

## (undated) DEVICE — SOLUTION IRRIG 1000ML 0.9% NACL USP BTL

## (undated) DEVICE — SUT ETHLN 3-0 18IN PS-1 NABSRB BLK 24MM 3/8 C

## (undated) DEVICE — #15 STERILE STAINLESS BLADE: Brand: STERILE STAINLESS BLADES

## (undated) DEVICE — 3M™ STERI-STRIP™ REINFORCED ADHESIVE SKIN CLOSURES, R1547, 1/2 IN X 4 IN (12 MM X 100 MM), 6 STRIPS/ENVELOPE: Brand: 3M™ STERI-STRIP™

## (undated) DEVICE — PACK PBDS ORTHO MODULE

## (undated) DEVICE — SUT MCRYL 4-0 18IN PS-2 ABSRB UD 19MM 3/8 CIR

## (undated) DEVICE — OCCLUSIVE GAUZE STRIP,3% BISMUTH TRIBROMOPHENATE IN PETROLATUM BLEND: Brand: XEROFORM

## (undated) DEVICE — GLOVE SUR 8 SENSICARE PI PIP GRN PWD F

## (undated) DEVICE — APPLICATOR PREP 26ML CHG 2% ISO ALC 70%

## (undated) DEVICE — SLEEVE COMPR MD KNEE LEN SGL USE KENDALL SCD

## (undated) DEVICE — GLOVE SUR 7.5 SENSICARE PI PIP CRM PWD F

## (undated) DEVICE — SUT MCRYL 3-0 27IN ABSRB UD 19MM PS-2 3/8

## (undated) DEVICE — GLOVE SUR 8 SENSICARE PI PIP CRM PWD F

## (undated) NOTE — LETTER
[address]  [address]  Authorization for Surgical Operation and Procedure     Date:___________                                                                                                         Time:__________  I hereby authorize Surgeon(s):  Tommie Rasheed MD, my physician and his/her assistants (if applicable), which may include medical students, residents, and/or fellows, to perform the following surgical operation/ procedure and administer such anesthesia as may be determined necessary by my physician:  Operation/Procedure name (s) Procedure(s):  RIGHT SHOULDER IRRIGATION AND DEBRIDEMENT, REMOVAL OF IMPLANT DEEP, POSSIBLE ARTHROTOMY WITH DRAINAGE on [address]   2.   I recognize that during the surgical operation/procedure, unforeseen conditions may necessitate additional or different procedures than those listed above.  I, therefore, further authorize and request that the above-named surgeon, assistants, or designees perform such procedures as are, in their judgment, necessary and desirable.    3.   My surgeon/physician has discussed prior to my surgery the potential benefits, risks and side effects of this procedure; the likelihood of achieving goals; and potential problems that might occur during recuperation.  They also discussed reasonable alternatives to the procedure, including risks, benefits, and side effects related to the alternatives and risks related to not receiving this procedure.  I have had all my questions answered and I acknowledge that no guarantee has been made as to the result that may be obtained.    4.   Should the need arise during my operation/procedure, which includes change of level of care prior to discharge, I also consent to the administration of blood and/or blood products.  Further, I understand that despite careful testing and screening of blood or blood products by collecting agencies, I may still be subject to ill effects as  a result of receiving a blood transfusion and/or blood products.  The following are some, but not all, of the potential risks that can occur: fever and allergic reactions, hemolytic reactions, transmission of diseases such as Hepatitis, AIDS and Cytomegalovirus (CMV) and fluid overload.  In the event that I wish to have an autologous transfusion of my own blood, or a directed donor transfusion, I will discuss this with my physician.  Check only if Refusing Blood or Blood Products  I understand refusal of blood or blood products as deemed necessary by my physician may have serious consequences to my condition to include possible death. I hereby assume responsibility for my refusal and release the hospital, its personnel, and my physicians from any responsibility for the consequences of my refusal.          o  Refuse      5.   I authorize the use of any specimen, organs, tissues, body parts or foreign objects that may be removed from my body during the operation/procedure for diagnosis, research or teaching purposes and their subsequent disposal by hospital authorities.  I also authorize the release of specimen test results and/or written reports to my treating physician on the hospital medical staff or other referring or consulting physicians involved in my care, at the discretion of the Pathologist or my treating physician.    6.   I consent to the photographing or videotaping of the operations or procedures to be performed, including appropriate portions of my body for medical, scientific, or educational purposes, provided my identity is not revealed by the pictures or by descriptive texts accompanying them.  If the procedure has been photographed/videotaped, the surgeon will obtain the original picture, image, videotape or CD.  The hospital will not be responsible for storage, release or maintenance of the picture, image, tape or CD.    7.   I consent to the presence of a  or observers in the  operating room as deemed necessary by my physician or their designees.    8.   I recognize that in the event my procedure results in extended X-Ray/fluoroscopy time, I may develop a skin reaction.    9. If I have a Do Not Attempt Resuscitation (DNAR) order in place, that status will be suspended while in the operating room, procedural suite, and during the recovery period unless otherwise explicitly stated by me (or a person authorized to consent on my behalf). The surgeon or my attending physician will determine when the applicable recovery period ends for purposes of reinstating the DNAR order.  10. Patients having a sterilization procedure: I understand that if the procedure is successful the results will be permanent and it will therefore be impossible for me to inseminate, conceive, or bear children.  I also understand that the procedure is intended to result in sterility, although the result has not been guaranteed.   11. I acknowledge that my physician has explained sedation/analgesia administration to me including the risk and benefits I consent to the administration of sedation/analgesia as may be necessary or desirable in the judgment of my physician.    I CERTIFY THAT I HAVE READ AND FULLY UNDERSTAND THE ABOVE CONSENT TO OPERATION and/or OTHER PROCEDURE.    _________________________________________  __________________________________  Signature of Patient     Signature of Responsible Person         ___________________________________         Printed Name of Responsible Person           _________________________________                 Relationship to Patient  _________________________________________  ______________________________  Signature of Witness          Date  Time      Patient Name: Cara Nguyễn     : 8/10/1995                 Printed: 2024     Medical Record #: [id number]                     Page 1 of 2                                    [address]  [place]  [address]    Consent for Anesthesia    Cara GOVEA agree to be cared for by an anesthesiologist, who is specially trained to monitor me and give me medicine to put me to sleep or keep me comfortable during my procedure    I understand that my anesthesiologist is not an employee or agent of [facility] or [employer]. He or she works for [employer].    As the patient asking for anesthesia services, I agree to:  Allow the anesthesiologist (anesthesia doctor) to give me medicine and do additional procedures as necessary. Some examples are: Starting or using an “IV” to give me medicine, fluids or blood during my procedure, and having a breathing tube placed to help me breathe when I’m asleep (intubation). In the event that my heart stops working properly, I understand that my anesthesiologist will make every effort to sustain my life, unless otherwise directed by [facility] Do Not Resuscitate documents.  Tell my anesthesia doctor before my procedure:  If I am pregnant.  The last time that I ate or drank.  All of the medicines I take (including prescriptions, herbal supplements, and pills I can buy without a prescription (including street drugs/illegal medications). Failure to inform my anesthesiologist about these medicines may increase my risk of anesthetic complications.  If I am allergic to anything or have had a reaction to anesthesia before.  I understand how the anesthesia medicine will help me (benefits).  I understand that with any type of anesthesia medicine there are risks:  The most common risks are: nausea, vomiting, sore throat, muscle soreness, damage to my eyes, mouth, or teeth (from breathing tube placement).  Rare risks include: remembering what happened during my procedure, allergic reactions to medications, injury to my airway, heart, lungs, vision, nerves, or muscles and in extremely rare instances death.  My doctor has explained to me  other choices available to me for my care (alternatives).  Pregnant Patients (“epidural”):  I understand that the risks of having an epidural (medicine given into my back to help control pain during labor), include itching, low blood pressure, difficulty urinating, headache or slowing of the baby’s heart. Very rare risks include infection, bleeding, seizure, irregular heart rhythms and nerve injury.  Regional Anesthesia (“spinal”, “epidural”, & “nerve blocks”):  I understand that rare but potential complications include headache, bleeding, infection, seizure, irregular heart rhythms, and nerve injury.    I can change my mind about having anesthesia services at any time before I get the medicine.    _____________________________________________________________________________  Patient (or Representative) Signature/Relationship to Patient  Date   Time    _____________________________________________________________________________   Name (if used)    Language/Organization   Time    _____________________________________________________________________________  Anesthesiologist Signature     Date   Time  I have discussed the procedure and information above with the patient (or patient’s representative) and answered their questions. The patient or their representative has agreed to have anesthesia services.    _____________________________________________________________________________  Witness        Date   Time  I have verified that the signature is that of the patient or patient’s representative, and that it was signed before the procedure  Patient Name: Cara Nguyễn     : 8/10/1995                 Printed: 2024     Medical Record #: [id number]                     Page 2 of 2

## (undated) NOTE — LETTER
[address]  [address]  Authorization for Surgical Operation and Procedure     Date:___________                                                                                                         Time:__________  I hereby authorize Surgeon(s):  Tommie Rasheed MD, my physician and his/her assistants (if applicable), which may include medical students, residents, and/or fellows, to perform the following surgical operation/ procedure and administer such anesthesia as may be determined necessary by my physician:  Operation/Procedure name (s) Procedure(s):  RIGHT SHOULDER IRRIGATION AND DEBRIDEMENT, REMOVAL OF IMPLANT DEEP, POSSIBLE ARTHROTOMY WITH DRAINAGE on [address]   2.   I recognize that during the surgical operation/procedure, unforeseen conditions may necessitate additional or different procedures than those listed above.  I, therefore, further authorize and request that the above-named surgeon, assistants, or designees perform such procedures as are, in their judgment, necessary and desirable.    3.   My surgeon/physician has discussed prior to my surgery the potential benefits, risks and side effects of this procedure; the likelihood of achieving goals; and potential problems that might occur during recuperation.  They also discussed reasonable alternatives to the procedure, including risks, benefits, and side effects related to the alternatives and risks related to not receiving this procedure.  I have had all my questions answered and I acknowledge that no guarantee has been made as to the result that may be obtained.    4.   Should the need arise during my operation/procedure, which includes change of level of care prior to discharge, I also consent to the administration of blood and/or blood products.  Further, I understand that despite careful testing and screening of blood or blood products by collecting agencies, I may still be subject to ill effects as  a result of receiving a blood transfusion and/or blood products.  The following are some, but not all, of the potential risks that can occur: fever and allergic reactions, hemolytic reactions, transmission of diseases such as Hepatitis, AIDS and Cytomegalovirus (CMV) and fluid overload.  In the event that I wish to have an autologous transfusion of my own blood, or a directed donor transfusion, I will discuss this with my physician.  Check only if Refusing Blood or Blood Products  I understand refusal of blood or blood products as deemed necessary by my physician may have serious consequences to my condition to include possible death. I hereby assume responsibility for my refusal and release the hospital, its personnel, and my physicians from any responsibility for the consequences of my refusal.          o  Refuse      5.   I authorize the use of any specimen, organs, tissues, body parts or foreign objects that may be removed from my body during the operation/procedure for diagnosis, research or teaching purposes and their subsequent disposal by hospital authorities.  I also authorize the release of specimen test results and/or written reports to my treating physician on the hospital medical staff or other referring or consulting physicians involved in my care, at the discretion of the Pathologist or my treating physician.    6.   I consent to the photographing or videotaping of the operations or procedures to be performed, including appropriate portions of my body for medical, scientific, or educational purposes, provided my identity is not revealed by the pictures or by descriptive texts accompanying them.  If the procedure has been photographed/videotaped, the surgeon will obtain the original picture, image, videotape or CD.  The hospital will not be responsible for storage, release or maintenance of the picture, image, tape or CD.    7.   I consent to the presence of a  or observers in the  operating room as deemed necessary by my physician or their designees.    8.   I recognize that in the event my procedure results in extended X-Ray/fluoroscopy time, I may develop a skin reaction.    9. If I have a Do Not Attempt Resuscitation (DNAR) order in place, that status will be suspended while in the operating room, procedural suite, and during the recovery period unless otherwise explicitly stated by me (or a person authorized to consent on my behalf). The surgeon or my attending physician will determine when the applicable recovery period ends for purposes of reinstating the DNAR order.  10. Patients having a sterilization procedure: I understand that if the procedure is successful the results will be permanent and it will therefore be impossible for me to inseminate, conceive, or bear children.  I also understand that the procedure is intended to result in sterility, although the result has not been guaranteed.   11. I acknowledge that my physician has explained sedation/analgesia administration to me including the risk and benefits I consent to the administration of sedation/analgesia as may be necessary or desirable in the judgment of my physician.    I CERTIFY THAT I HAVE READ AND FULLY UNDERSTAND THE ABOVE CONSENT TO OPERATION and/or OTHER PROCEDURE.    _________________________________________  __________________________________  Signature of Patient     Signature of Responsible Person         ___________________________________         Printed Name of Responsible Person           _________________________________                 Relationship to Patient  _________________________________________  ______________________________  Signature of Witness          Date  Time      Patient Name: Cara Nguyễn     : 8/10/1995                 Printed: 2024     Medical Record #: [id number]                     Page 1 of 2                                    [address]  [place]  [address]    Consent for Anesthesia    Cara GOVEA agree to be cared for by an anesthesiologist, who is specially trained to monitor me and give me medicine to put me to sleep or keep me comfortable during my procedure    I understand that my anesthesiologist is not an employee or agent of [facility] or [employer]. He or she works for [employer].    As the patient asking for anesthesia services, I agree to:  Allow the anesthesiologist (anesthesia doctor) to give me medicine and do additional procedures as necessary. Some examples are: Starting or using an “IV” to give me medicine, fluids or blood during my procedure, and having a breathing tube placed to help me breathe when I’m asleep (intubation). In the event that my heart stops working properly, I understand that my anesthesiologist will make every effort to sustain my life, unless otherwise directed by [facility] Do Not Resuscitate documents.  Tell my anesthesia doctor before my procedure:  If I am pregnant.  The last time that I ate or drank.  All of the medicines I take (including prescriptions, herbal supplements, and pills I can buy without a prescription (including street drugs/illegal medications). Failure to inform my anesthesiologist about these medicines may increase my risk of anesthetic complications.  If I am allergic to anything or have had a reaction to anesthesia before.  I understand how the anesthesia medicine will help me (benefits).  I understand that with any type of anesthesia medicine there are risks:  The most common risks are: nausea, vomiting, sore throat, muscle soreness, damage to my eyes, mouth, or teeth (from breathing tube placement).  Rare risks include: remembering what happened during my procedure, allergic reactions to medications, injury to my airway, heart, lungs, vision, nerves, or muscles and in extremely rare instances death.  My doctor has explained to me  other choices available to me for my care (alternatives).  Pregnant Patients (“epidural”):  I understand that the risks of having an epidural (medicine given into my back to help control pain during labor), include itching, low blood pressure, difficulty urinating, headache or slowing of the baby’s heart. Very rare risks include infection, bleeding, seizure, irregular heart rhythms and nerve injury.  Regional Anesthesia (“spinal”, “epidural”, & “nerve blocks”):  I understand that rare but potential complications include headache, bleeding, infection, seizure, irregular heart rhythms, and nerve injury.    I can change my mind about having anesthesia services at any time before I get the medicine.    _____________________________________________________________________________  Patient (or Representative) Signature/Relationship to Patient  Date   Time    _____________________________________________________________________________   Name (if used)    Language/Organization   Time    _____________________________________________________________________________  Anesthesiologist Signature     Date   Time  I have discussed the procedure and information above with the patient (or patient’s representative) and answered their questions. The patient or their representative has agreed to have anesthesia services.    _____________________________________________________________________________  Witness        Date   Time  I have verified that the signature is that of the patient or patient’s representative, and that it was signed before the procedure  Patient Name: Cara Nguyễn     : 8/10/1995                 Printed: 2024     Medical Record #: [id number]                     Page 2 of 2